# Patient Record
Sex: FEMALE | Race: AMERICAN INDIAN OR ALASKA NATIVE | NOT HISPANIC OR LATINO | Employment: FULL TIME | URBAN - METROPOLITAN AREA
[De-identification: names, ages, dates, MRNs, and addresses within clinical notes are randomized per-mention and may not be internally consistent; named-entity substitution may affect disease eponyms.]

---

## 2023-09-26 ENCOUNTER — APPOINTMENT (OUTPATIENT)
Dept: PRIMARY CARE | Facility: CLINIC | Age: 23
End: 2023-09-26
Payer: COMMERCIAL

## 2023-09-28 PROBLEM — K59.09 CHRONIC CONSTIPATION: Status: ACTIVE | Noted: 2023-09-28

## 2023-09-28 PROBLEM — I47.11 INAPPROPRIATE SINUS TACHYCARDIA (CMS-HCC): Status: ACTIVE | Noted: 2023-09-28

## 2023-09-28 PROBLEM — G90.A POTS (POSTURAL ORTHOSTATIC TACHYCARDIA SYNDROME): Status: ACTIVE | Noted: 2023-09-28

## 2023-09-28 PROBLEM — K21.9 GERD (GASTROESOPHAGEAL REFLUX DISEASE): Status: ACTIVE | Noted: 2023-09-28

## 2023-09-28 PROBLEM — R55 NEAR SYNCOPE: Status: ACTIVE | Noted: 2023-09-28

## 2023-09-28 PROBLEM — R42 LIGHTHEADEDNESS: Status: ACTIVE | Noted: 2023-09-28

## 2023-09-28 PROBLEM — J45.909 ASTHMA (HHS-HCC): Status: ACTIVE | Noted: 2023-09-28

## 2023-09-28 PROBLEM — E55.9 VITAMIN D DEFICIENCY: Status: ACTIVE | Noted: 2023-09-28

## 2023-09-28 PROBLEM — R00.2 PALPITATIONS: Status: ACTIVE | Noted: 2023-09-28

## 2023-09-28 PROBLEM — K60.2 ANAL FISSURE: Status: ACTIVE | Noted: 2023-09-28

## 2023-09-28 PROBLEM — R76.8 ANA POSITIVE: Status: ACTIVE | Noted: 2023-09-28

## 2023-09-28 PROBLEM — I95.1 ORTHOSTATIC HYPOTENSION: Status: ACTIVE | Noted: 2023-09-28

## 2023-09-28 PROBLEM — R10.9 ABDOMINAL PAIN OF MULTIPLE SITES: Status: ACTIVE | Noted: 2023-09-28

## 2023-09-28 RX ORDER — DIPHENHYDRAMINE HCL 25 MG
25 CAPSULE ORAL NIGHTLY
COMMUNITY
End: 2023-10-05 | Stop reason: ALTCHOICE

## 2023-09-28 RX ORDER — ALBUTEROL SULFATE 90 UG/1
AEROSOL, METERED RESPIRATORY (INHALATION)
COMMUNITY
Start: 2020-01-13 | End: 2024-02-21 | Stop reason: WASHOUT

## 2023-09-28 RX ORDER — METOCLOPRAMIDE 10 MG/1
10 TABLET ORAL 3 TIMES DAILY
COMMUNITY
End: 2023-10-05 | Stop reason: ALTCHOICE

## 2023-09-28 RX ORDER — PYRIDOXINE HCL (VITAMIN B6) 25 MG
25 TABLET ORAL 3 TIMES DAILY
COMMUNITY
Start: 2022-11-28 | End: 2023-10-05 | Stop reason: ALTCHOICE

## 2023-10-05 ENCOUNTER — APPOINTMENT (OUTPATIENT)
Dept: LAB | Facility: LAB | Age: 23
End: 2023-10-05
Payer: COMMERCIAL

## 2023-10-05 ENCOUNTER — OFFICE VISIT (OUTPATIENT)
Dept: RHEUMATOLOGY | Facility: CLINIC | Age: 23
End: 2023-10-05
Payer: COMMERCIAL

## 2023-10-05 VITALS
HEIGHT: 68 IN | TEMPERATURE: 97.4 F | RESPIRATION RATE: 18 BRPM | WEIGHT: 160 LBS | SYSTOLIC BLOOD PRESSURE: 123 MMHG | HEART RATE: 80 BPM | BODY MASS INDEX: 24.25 KG/M2 | DIASTOLIC BLOOD PRESSURE: 75 MMHG

## 2023-10-05 DIAGNOSIS — M35.7 HYPERMOBILITY SYNDROME: ICD-10-CM

## 2023-10-05 DIAGNOSIS — G90.A POSTURAL ORTHOSTATIC TACHYCARDIA SYNDROME: Primary | ICD-10-CM

## 2023-10-05 PROCEDURE — 99204 OFFICE O/P NEW MOD 45 MIN: CPT | Performed by: INTERNAL MEDICINE

## 2023-10-05 PROCEDURE — 99214 OFFICE O/P EST MOD 30 MIN: CPT | Mod: GC | Performed by: INTERNAL MEDICINE

## 2023-10-05 PROCEDURE — 80053 COMPREHEN METABOLIC PANEL: CPT

## 2023-10-05 PROCEDURE — 85025 COMPLETE CBC W/AUTO DIFF WBC: CPT

## 2023-10-05 PROCEDURE — 86140 C-REACTIVE PROTEIN: CPT

## 2023-10-05 PROCEDURE — 86225 DNA ANTIBODY NATIVE: CPT

## 2023-10-05 PROCEDURE — 36415 COLL VENOUS BLD VENIPUNCTURE: CPT

## 2023-10-05 PROCEDURE — 86038 ANTINUCLEAR ANTIBODIES: CPT

## 2023-10-05 PROCEDURE — 86235 NUCLEAR ANTIGEN ANTIBODY: CPT

## 2023-10-05 PROCEDURE — 85652 RBC SED RATE AUTOMATED: CPT

## 2023-10-05 RX ORDER — SODIUM CHLORIDE 1000 MG
2 TABLET, SOLUBLE MISCELLANEOUS DAILY
COMMUNITY
End: 2024-02-17 | Stop reason: WASHOUT

## 2023-10-05 RX ORDER — FAMOTIDINE 20 MG/1
20 TABLET, FILM COATED ORAL DAILY PRN
COMMUNITY
End: 2023-10-19 | Stop reason: WASHOUT

## 2023-10-05 ASSESSMENT — ENCOUNTER SYMPTOMS
PSYCHIATRIC NEGATIVE: 1
ARTHRALGIAS: 1
CONSTIPATION: 1
ALLERGIC/IMMUNOLOGIC NEGATIVE: 1
ENDOCRINE NEGATIVE: 1
HEMATOLOGIC/LYMPHATIC NEGATIVE: 1
EYES NEGATIVE: 1
RESPIRATORY NEGATIVE: 1
CONSTITUTIONAL NEGATIVE: 1

## 2023-10-05 ASSESSMENT — PAIN SCALES - GENERAL: PAINLEVEL: 0-NO PAIN

## 2023-10-05 NOTE — PATIENT INSTRUCTIONS
- We are ordering some blood tests for you to rule out the possibility of any autoimmune disease causing POTS and neuropathy.    - Please follow up with neurology for the POTS and numbness and tingling you are experiencing.  - Please wear the wrist splints at night for the carpal tunnel syndrome  - we are also referring you to PT for the knee pain associated with EDS    - RTC x PRN

## 2023-10-05 NOTE — PROGRESS NOTES
Subjective   Patient ID: [id number]   Shasha De León is a 23 y.o. female who presents for Postural Orthostatic Tachycardia Syndrome (Pots) (New patient visit).  HPI  Diagnosed with POTS - Lightheadedness, dizziness and syncope - 2 times, last time 2 weekends ago  Tilt table test proved POTS - Has not been on treatment yet.  Hx of COVID-19 when symptoms of POTS started.  Suspicion of EDS that's why referred to Rheumatology.     C/o lower back pain from disc disease.  Had pelvic floor dysfunction after 2021 then she had PT which helped in 2022.  Occasional pains when walking short distances and if squats down too fast then her knees hurt,  Has CTS bilateral since 1st pregnancy.  Was wearing wrist splints. That did not help  The numbness has progressed above the wrists , at times feel weak around the arms as well.  Occasional numbness and tingling when standing for longer duration in the lower limbs as well.     Fevers, night sweats, Weight changes,  No lumps or bumps  NO rash , Photosensitive, No oral ulcers or genital ulcers, no alopecia  Denies xerodermia, xerophthalmia, xerostomia.  No joint swellings    No family history of Anuradha Danlos disease but mentions easy bruisability in siblings who's currently undergoing work up.  No autoimmune disease in the family like Lupus, Sjogren's    Review of Systems   Constitutional: Negative.    HENT: Negative.     Eyes: Negative.    Respiratory: Negative.     Gastrointestinal:  Positive for constipation.   Endocrine: Negative.    Genitourinary: Negative.    Musculoskeletal:  Positive for arthralgias.   Allergic/Immunologic: Negative.    Hematological: Negative.    Psychiatric/Behavioral: Negative.         Objective   Physical Exam    Physical Exam  General: Cooperative, in NAD   Eyes: Conjunctiva clear, sclera white, anicteric   Nose: No external deformity, no mucosal crusting or signs of bleeding   Throat/Mouth: Moist mucous membranes, normal dentition, no ulcers or lesions  "  Lungs: No respiratory distress; lungs CTAB; no wheezes, rales, rhonchi, or stridor   Heart: Normal S1 and S2; regular rate and rhythm; no murmurs, rubs, or gallops  Skin: No rashes, ulcers, tophi, or nodules noted  MSK:   Spine: Normal posture, no point tenderness to palpation, normal ROM  Upper Extremities:   Hands: No erythema, warmth, synovitis, or pain of the DIPs, PIPs, or MCPs; normal ROM    Tinnel +ve bilaterally    Wrists: No erythema, warmth, synovitis, or pain of the wrists; normal ROM  Elbows: No erythema, warmth, synovitis, or pain; normal ROM   Shoulders: No erythema, warmth, appreciable swelling, or pain; normal ROM   Lower Extremities:   Hips: No gross deformity, erythema, warmth, or pain; normal ROM   Knees: No erythema, warmth, swelling, or pain; normal ROM   Ankles: No erythema, warmth, synovitis, or pain; normal ROM  Feet: No gross deformity, erythema, or swelling; MTP squeeze negative bilaterally     Hyperextensible thumb + elbow+ knee + able to touch the floor  with flat palms without bending knees  Mandan score >6    Patellar hypermobility apparent on exam as well    Lab Results   Component Value Date    WBC 5.0 07/12/2022    HGB 14.2 07/12/2022    HCT 42.8 07/12/2022    MCV 86 07/12/2022     07/12/2022    No results found for: \"CRP\"   Lab Results   Component Value Date    GLUCOSE 81 07/12/2022    CALCIUM 9.5 07/12/2022     07/12/2022    K 4.2 07/12/2022    CO2 28 07/12/2022     07/12/2022    BUN 12 07/12/2022    CREATININE 0.80 07/12/2022      Assessment/Plan   Diagnoses and all orders for this visit:  Postural orthostatic tachycardia syndrome  Rule out autoimmune phenomenon although low suspicion clinically  But will proceed with labs for Sjogrens at least  -     CBC and Auto Differential; Future  -     Comprehensive Metabolic Panel; Future  -     Sedimentation Rate; Future  -     C-Reactive Protein; Future  -     PRICE + ARNOLD Panel; Future  -       Hypermobility " syndrome  Brightons criteria > 6 + Arthralgias > 3 months and POTS  But not fulfilling the hEDS diagnosis at this juncture  No lab testing indicated  Will follow with neurology for POTS  Referral to Physical Therapy; Future     ?CTS - bilateral tinnels positive  But patient reports numbness starts from mid arm region bilaterally  Unsure of significance at this time, Small fibre neuropathy can co exist with POTS  Exam not entirely consistent with peripheral type of neuropathy, has patchy involvement as per my exam today  Sjogren serology ordered  EMG decision as per neurology colleagues    Case seen and discussed with Dr Chris De Oliveira  Rheumatology Fellow    Dwight De Oliveira MD

## 2023-10-06 LAB
ALBUMIN SERPL BCP-MCNC: 4.7 G/DL (ref 3.4–5)
ALP SERPL-CCNC: 76 U/L (ref 33–110)
ALT SERPL W P-5'-P-CCNC: 12 U/L (ref 7–45)
ANA SER QL HEP2 SUBST: NEGATIVE
ANION GAP SERPL CALC-SCNC: 12 MMOL/L (ref 10–20)
AST SERPL W P-5'-P-CCNC: 16 U/L (ref 9–39)
BASOPHILS # BLD AUTO: 0.05 X10*3/UL (ref 0–0.1)
BASOPHILS NFR BLD AUTO: 0.8 %
BILIRUB SERPL-MCNC: 0.4 MG/DL (ref 0–1.2)
BUN SERPL-MCNC: 11 MG/DL (ref 6–23)
CALCIUM SERPL-MCNC: 9.6 MG/DL (ref 8.6–10.6)
CENTROMERE B AB SER-ACNC: <0.2 AI
CHLORIDE SERPL-SCNC: 105 MMOL/L (ref 98–107)
CHROMATIN AB SERPL-ACNC: <0.2 AI
CO2 SERPL-SCNC: 27 MMOL/L (ref 21–32)
CREAT SERPL-MCNC: 0.76 MG/DL (ref 0.5–1.05)
CRP SERPL-MCNC: <0.1 MG/DL
DSDNA AB SER-ACNC: 1 IU/ML
ENA JO1 AB SER QL IA: <0.2 AI
ENA RNP AB SER IA-ACNC: <0.2 AI
ENA SCL70 AB SER QL IA: <0.2 AI
ENA SM AB SER IA-ACNC: <0.2 AI
ENA SM+RNP AB SER QL IA: <0.2 AI
ENA SS-A AB SER IA-ACNC: <0.2 AI
ENA SS-B AB SER IA-ACNC: <0.2 AI
EOSINOPHIL # BLD AUTO: 0.14 X10*3/UL (ref 0–0.7)
EOSINOPHIL NFR BLD AUTO: 2.3 %
ERYTHROCYTE [DISTWIDTH] IN BLOOD BY AUTOMATED COUNT: 12.2 % (ref 11.5–14.5)
ERYTHROCYTE [SEDIMENTATION RATE] IN BLOOD BY WESTERGREN METHOD: 2 MM/H (ref 0–20)
GFR SERPL CREATININE-BSD FRML MDRD: >90 ML/MIN/1.73M*2
GLUCOSE SERPL-MCNC: 82 MG/DL (ref 74–99)
HCT VFR BLD AUTO: 42.7 % (ref 36–46)
HGB BLD-MCNC: 13.7 G/DL (ref 12–16)
IMM GRANULOCYTES # BLD AUTO: 0.02 X10*3/UL (ref 0–0.7)
IMM GRANULOCYTES NFR BLD AUTO: 0.3 % (ref 0–0.9)
LYMPHOCYTES # BLD AUTO: 2.15 X10*3/UL (ref 1.2–4.8)
LYMPHOCYTES NFR BLD AUTO: 35.5 %
MCH RBC QN AUTO: 27.5 PG (ref 26–34)
MCHC RBC AUTO-ENTMCNC: 32.1 G/DL (ref 32–36)
MCV RBC AUTO: 86 FL (ref 80–100)
MONOCYTES # BLD AUTO: 0.44 X10*3/UL (ref 0.1–1)
MONOCYTES NFR BLD AUTO: 7.3 %
NEUTROPHILS # BLD AUTO: 3.26 X10*3/UL (ref 1.2–7.7)
NEUTROPHILS NFR BLD AUTO: 53.8 %
NRBC BLD-RTO: 0 /100 WBCS (ref 0–0)
PLATELET # BLD AUTO: 261 X10*3/UL (ref 150–450)
PMV BLD AUTO: 9.7 FL (ref 7.5–11.5)
POTASSIUM SERPL-SCNC: 4.3 MMOL/L (ref 3.5–5.3)
PROT SERPL-MCNC: 7.6 G/DL (ref 6.4–8.2)
RBC # BLD AUTO: 4.98 X10*6/UL (ref 4–5.2)
RIBOSOMAL P AB SER-ACNC: <0.2 AI
SODIUM SERPL-SCNC: 140 MMOL/L (ref 136–145)
WBC # BLD AUTO: 6.1 X10*3/UL (ref 4.4–11.3)

## 2023-10-19 ENCOUNTER — OFFICE VISIT (OUTPATIENT)
Dept: PRIMARY CARE | Facility: CLINIC | Age: 23
End: 2023-10-19
Payer: COMMERCIAL

## 2023-10-19 VITALS
OXYGEN SATURATION: 98 % | SYSTOLIC BLOOD PRESSURE: 113 MMHG | TEMPERATURE: 97.2 F | RESPIRATION RATE: 14 BRPM | WEIGHT: 156 LBS | DIASTOLIC BLOOD PRESSURE: 78 MMHG | BODY MASS INDEX: 23.72 KG/M2 | HEART RATE: 74 BPM

## 2023-10-19 DIAGNOSIS — G56.03 BILATERAL CARPAL TUNNEL SYNDROME: ICD-10-CM

## 2023-10-19 DIAGNOSIS — M25.561 CHRONIC PAIN OF BOTH KNEES: ICD-10-CM

## 2023-10-19 DIAGNOSIS — K58.1 IRRITABLE BOWEL SYNDROME WITH CONSTIPATION: ICD-10-CM

## 2023-10-19 DIAGNOSIS — G90.A POTS (POSTURAL ORTHOSTATIC TACHYCARDIA SYNDROME): Primary | ICD-10-CM

## 2023-10-19 DIAGNOSIS — G89.29 CHRONIC PAIN OF BOTH KNEES: ICD-10-CM

## 2023-10-19 DIAGNOSIS — I34.0 NONRHEUMATIC MITRAL VALVE REGURGITATION: ICD-10-CM

## 2023-10-19 DIAGNOSIS — M25.562 CHRONIC PAIN OF BOTH KNEES: ICD-10-CM

## 2023-10-19 PROBLEM — R10.9 ABDOMINAL PAIN OF MULTIPLE SITES: Status: RESOLVED | Noted: 2023-09-28 | Resolved: 2023-10-19

## 2023-10-19 PROBLEM — R76.8 ANA POSITIVE: Status: RESOLVED | Noted: 2023-09-28 | Resolved: 2023-10-19

## 2023-10-19 PROCEDURE — 99214 OFFICE O/P EST MOD 30 MIN: CPT | Performed by: FAMILY MEDICINE

## 2023-10-19 PROCEDURE — 1036F TOBACCO NON-USER: CPT | Performed by: FAMILY MEDICINE

## 2023-10-19 ASSESSMENT — ENCOUNTER SYMPTOMS
NAUSEA: 0
LIGHT-HEADEDNESS: 1
PSYCHIATRIC NEGATIVE: 1
VOMITING: 0
BLOOD IN STOOL: 0
ANAL BLEEDING: 0
ABDOMINAL PAIN: 0
DIARRHEA: 0
ABDOMINAL DISTENTION: 0
CONSTIPATION: 1
RECTAL PAIN: 0
CONSTITUTIONAL NEGATIVE: 1
RESPIRATORY NEGATIVE: 1
CARDIOVASCULAR NEGATIVE: 1

## 2023-10-19 NOTE — ASSESSMENT & PLAN NOTE
Neurology's assessment of tilt table test results indicated pots syndrome diagnosis.  Keep appointment with neurology at the end of November and refer to cardiology for additional input.

## 2023-10-19 NOTE — PROGRESS NOTES
Subjective   Patient ID: Shasha De León is a 23 y.o. female who presents for review testing results.    HPI patient today for follow-up since she has been in last she underwent vaginal delivery in May 2023.  She currently is breast-feeding.  She saw neurology with regards to evaluation of her lightheadedness and near syncope additional testing was performed and they have diagnosed her with POTS.  He has a follow-up with neurology at the end of November.  They also mention to her to get evaluated for possible Erler's Danlos syndrome.  She saw a rheumatology they do not feel she has this condition.  They ordered a few other additional rheumatologic tests were sure centra unremarkable.  They suggested that she go to physical therapy for her bilateral knee pain.  She complains of pain in both hands feels like electrical shocks pins-and-needles sensation worse when she sleeps.  Started during her pregnancy.  She has rheumatology told her they think she has carpal tunnel.  She is can discuss it further with the neurologist in a few weeks and see about additional testing.  Finally she continues have issues with intermittent constipation abdominal cramping and bloating some nausea no vomiting.  When she is tried taking Trulance she then swings the other way and gets diarrhea.    Review of Systems   Constitutional: Negative.    Respiratory: Negative.     Cardiovascular: Negative.    Gastrointestinal:  Positive for constipation. Negative for abdominal distention, abdominal pain, anal bleeding, blood in stool, diarrhea, nausea, rectal pain and vomiting.   Musculoskeletal:         Bilateral knee pain  Pins and needle discomfort in both hands   Neurological:  Positive for light-headedness.   Psychiatric/Behavioral: Negative.         Objective   /78   Pulse 74   Temp 36.2 °C (97.2 °F)   Resp 14   Wt 70.8 kg (156 lb)   SpO2 98%   BMI 23.72 kg/m²     Physical Exam  Vitals and nursing note reviewed.   Constitutional:        General: She is not in acute distress.     Appearance: Normal appearance. She is not ill-appearing.   HENT:      Head: Normocephalic and atraumatic.      Right Ear: Tympanic membrane, ear canal and external ear normal.      Left Ear: Tympanic membrane, ear canal and external ear normal.      Mouth/Throat:      Pharynx: Oropharynx is clear.   Eyes:      Extraocular Movements: Extraocular movements intact.   Cardiovascular:      Rate and Rhythm: Normal rate and regular rhythm.      Pulses: Normal pulses.      Heart sounds: Normal heart sounds.   Pulmonary:      Effort: Pulmonary effort is normal.      Breath sounds: Normal breath sounds.   Abdominal:      General: Abdomen is flat. Bowel sounds are normal. There is no distension.      Palpations: Abdomen is soft.      Tenderness: There is no abdominal tenderness. There is no guarding or rebound.   Musculoskeletal:         General: Normal range of motion.      Cervical back: Neck supple.      Comments: Positive Tinel's sign and Phalen sign at both wrist.  Knees flexion extension grossly intact minimal discomfort.  Slightly increased mobility laterally in both patella.   Skin:     General: Skin is warm.   Neurological:      Mental Status: She is alert and oriented to person, place, and time. Mental status is at baseline.   Psychiatric:         Mood and Affect: Mood normal.     Recent special reports reviewed with patient    Follow through with neurology with regards to POTS diagnosis  They have told her to continue the sodium chloride tablets  Also she is going to discuss carpal tunnel syndrome and see if they can get an EMG completed    Regarding her GI symptoms we discussed that they may be related to IBS with a constipation component.  Unfortunately most of the meds are not indicated when breast-feeding we will try dietary modifications reevaluate in 8 weeks if symptoms or not improving will consider GI referral    Bilateral knee x-rays and start physical  therapy    Cardiology referral for further input with regards to POTS as well as her past history of mitral regurg    Recent labs reviewed with patient    Refuses flu vaccine    Return to office in 8 weeks to reassess her case    Assessment/Plan   Problem List Items Addressed This Visit             ICD-10-CM    POTS (postural orthostatic tachycardia syndrome) - Primary G90.A     Neurology's assessment of tilt table test results indicated pots syndrome diagnosis.  Keep appointment with neurology at the end of November and refer to cardiology for additional input.         Relevant Orders    Follow Up In Primary Care - Established    Referral to Cardiology    Chronic pain of both knees M25.561, M25.562, G89.29     Referral to physical therapy as per rheumatology's recommendation  Bilateral knee x-rays         Relevant Orders    Follow Up In Primary Care - Established    XR knee right 3 views    XR knee left 3 views    Bilateral carpal tunnel syndrome G56.03     Clinically suspect bilateral carpal tunnel.  She will see neurology in a few weeks regarding getting an EMG done.  If they do not follow-up on this then we will discuss EMG testing at her appointment in 8 weeks.         Relevant Orders    Follow Up In Primary Care - Established    Irritable bowel syndrome with constipation K58.1     Patient appears to have a constipation component associated with her IBS.  Trulance which she is taken previously unfortunately causes her to get diarrhea.  She currently is breast-feeding and most of the more common IBS meds are not recommended we will monitor symptoms for now discussed dietary modifications.  And reevaluate in 8 weeks.         Relevant Orders    Follow Up In Primary Care - Established    Nonrheumatic mitral valve regurgitation I34.0     Echo from May 2021 reviewed.  Refer to cardiology for follow-up         Relevant Orders    Referral to Cardiology

## 2023-10-19 NOTE — ASSESSMENT & PLAN NOTE
Clinically suspect bilateral carpal tunnel.  She will see neurology in a few weeks regarding getting an EMG done.  If they do not follow-up on this then we will discuss EMG testing at her appointment in 8 weeks.

## 2023-10-19 NOTE — ASSESSMENT & PLAN NOTE
Patient appears to have a constipation component associated with her IBS.  Trulance which she is taken previously unfortunately causes her to get diarrhea.  She currently is breast-feeding and most of the more common IBS meds are not recommended we will monitor symptoms for now discussed dietary modifications.  And reevaluate in 8 weeks.

## 2023-10-25 ENCOUNTER — ANCILLARY PROCEDURE (OUTPATIENT)
Dept: RADIOLOGY | Facility: CLINIC | Age: 23
End: 2023-10-25
Payer: COMMERCIAL

## 2023-10-25 DIAGNOSIS — M25.561 CHRONIC PAIN OF BOTH KNEES: ICD-10-CM

## 2023-10-25 DIAGNOSIS — G89.29 CHRONIC PAIN OF BOTH KNEES: ICD-10-CM

## 2023-10-25 DIAGNOSIS — M25.562 CHRONIC PAIN OF BOTH KNEES: ICD-10-CM

## 2023-10-25 PROCEDURE — 73562 X-RAY EXAM OF KNEE 3: CPT | Mod: LEFT SIDE | Performed by: RADIOLOGY

## 2023-10-25 PROCEDURE — 73562 X-RAY EXAM OF KNEE 3: CPT | Mod: LT

## 2023-10-25 PROCEDURE — 73562 X-RAY EXAM OF KNEE 3: CPT | Mod: RT

## 2023-11-06 ENCOUNTER — APPOINTMENT (OUTPATIENT)
Dept: NEUROLOGY | Facility: CLINIC | Age: 23
End: 2023-11-06
Payer: COMMERCIAL

## 2023-11-20 ENCOUNTER — APPOINTMENT (OUTPATIENT)
Dept: NEUROLOGY | Facility: CLINIC | Age: 23
End: 2023-11-20
Payer: COMMERCIAL

## 2023-12-18 ENCOUNTER — TELEPHONE (OUTPATIENT)
Dept: PRIMARY CARE | Facility: CLINIC | Age: 23
End: 2023-12-18
Payer: COMMERCIAL

## 2023-12-18 NOTE — TELEPHONE ENCOUNTER
Patient called in and is wondering if you wanted her to see a specific cardiologist, or just to find one that is closest to her. Please advise.

## 2023-12-21 ENCOUNTER — APPOINTMENT (OUTPATIENT)
Dept: PRIMARY CARE | Facility: CLINIC | Age: 23
End: 2023-12-21
Payer: COMMERCIAL

## 2024-01-03 ENCOUNTER — OFFICE VISIT (OUTPATIENT)
Dept: NEUROLOGY | Facility: HOSPITAL | Age: 24
End: 2024-01-03
Payer: COMMERCIAL

## 2024-01-03 VITALS
HEART RATE: 116 BPM | RESPIRATION RATE: 18 BRPM | BODY MASS INDEX: 22.58 KG/M2 | DIASTOLIC BLOOD PRESSURE: 86 MMHG | WEIGHT: 149 LBS | TEMPERATURE: 96.6 F | SYSTOLIC BLOOD PRESSURE: 113 MMHG | HEIGHT: 68 IN

## 2024-01-03 DIAGNOSIS — G56.03 CARPAL TUNNEL SYNDROME ON BOTH SIDES: ICD-10-CM

## 2024-01-03 DIAGNOSIS — G90.A POTS (POSTURAL ORTHOSTATIC TACHYCARDIA SYNDROME): Primary | ICD-10-CM

## 2024-01-03 PROCEDURE — 1036F TOBACCO NON-USER: CPT | Performed by: PSYCHIATRY & NEUROLOGY

## 2024-01-03 PROCEDURE — 99205 OFFICE O/P NEW HI 60 MIN: CPT | Performed by: PSYCHIATRY & NEUROLOGY

## 2024-01-03 PROCEDURE — 99215 OFFICE O/P EST HI 40 MIN: CPT | Mod: GC | Performed by: PSYCHIATRY & NEUROLOGY

## 2024-01-03 ASSESSMENT — PAIN SCALES - GENERAL: PAINLEVEL: 0-NO PAIN

## 2024-01-03 NOTE — PROGRESS NOTES
Date of Service: 1/3/2024  Patient: Shasha De León  MRN: [id number]  Primary Care Physician: Bandar Palomo MD     History of Present Illness:    Ms. De León is a 23 y.o. right handed  female presenting for evaluation of POTS and bilateral hand numbness.      Since her teenage years she has been having lightheadedness when she stands up, vision blurs, feels like she is going to faint.  This lasts for short time and is associated with palpitations and tingling in the legs and nausea.  The symptoms also occur with position changes, for example turning in the bed and resolves when she bends forward or sits down.  Worsens during the summer seasons and improves during bryant.  She has had 2 syncopal events, first occurred in the summer 2022 and second in summer 2023.  The symptoms worsened significantly with her first pregnancy in 2021, after which she had autonomic testing done on 9/1/2023 which showed an exaggerated orthostatic tachycardia, or rise in heart rate by more than 50 bpm on tilt table testing.  She also followed with cardiology since 2021, echocardiogram from 5/2021 showed normal LV function and ejection fraction.  Cardiac monitoring showed sinus rhythm with rare PACs.  She drinks about 1 gallon of fluid every day.  She tried salt tabs for a month without improvement and stopped taking it.  She wore compression stockings during the pregnancy up to the knee, on and off for few months, and is currently not wearing those.At this point, her symptoms have improved during the winter season.    She also reports decreased sweating throughout her life and recent abdominal cramps and constipation and was diagnosed with irritable bowel syndrome.    She also complains of numbness and tingling in both hands, which started in 2021 when she was pregnant and was clinically diagnosed with bilateral carpal tunnel syndrome.  It improved slightly after her first delivery but worsened significantly with the second  "pregnancy in 2023 and has not resolved since, rather worsened.  She reports tingling in both hands involving all fingers, worse at night and when holding her baby in the arms with arm flexed.  She also endorses mild weakness in both hands, right being slightly worse than the left, however, tingling is limited in both hands.  The symptoms improved when she straightens her arm or shakes her hands.    She has 2 kids-2-year-old and 7-month-old.  She is currently breast-feeding.      No Known Allergies     Medications:    Current Outpatient Medications:     albuterol 90 mcg/actuation inhaler, 2 puffs every 4-6 hours as needed shortness of breath or cough, Disp: , Rfl:     PNV no.153/FA/om3/dha/epa/fish (PRENATAL GUMMIES ORAL), Prenatal Gummies 0.18-25 MG Oral Tablet Chewable  Refills: 0      Start : 7-Jul-2022  Active, Disp: , Rfl:     sodium chloride 1,000 mg tablet, Take 2 tablets (2 g) by mouth once daily., Disp: , Rfl:      Problem List Items Addressed This Visit       POTS (postural orthostatic tachycardia syndrome)    Relevant Orders    Compression Stockings 30-40 mmHg     Other Visit Diagnoses       Carpal tunnel syndrome on both sides    -  Primary    Relevant Orders    EMG & nerve conduction            Physical Exam:     /86   Pulse (!) 116   Temp 35.9 °C (96.6 °F)   Resp 18   Ht 1.727 m (5' 8\")   Wt 67.6 kg (149 lb)   BMI 22.66 kg/m²     Brief Neurological Exam:  Mental status: alert and oriented to person, place, and date. Speech is intact to conversation. Fund of knowledge is normal.     Cranial nerves:  CN 2   Visual fields full to confrontation.   CN 3, 4, 6   Pupils round, 4 mm in diameter, equally reactive to light. Lids symmetric; no ptosis. EOMs normal alignment, full range.   No nystagmus.   CN 5   Facial sensation intact bilaterally.   CN 7   Normal and symmetric facial strength. Nasolabial folds symmetric.   CN 8   Hearing intact to conversation.   CN 9   Palate elevates symmetrically. "   CN 11   Normal strength of shoulder shrug and neck turning.   CN 12   Tongue midline, with normal bulk and strength; no fasciculations.      Motor:  Muscle bulk and tone are normal. There are no fasciculations, tremor or other abnormal movement.    MANUAL MUSCLE TESTING IS AS FOLLOWS:    R L      5 5 Shoulder abduction   5 5 Elbow flexion   5 5 Elbow extension   5 5 Wrist flexion   5 5 Wrist extension   5 5 Finger flexion   5 5 Finger extension   5 5 Finger abduction   5 5 Thumb distal flexion   5 5 Thumb abduction   Phalen's test: Tingling in all the fingers in both hands.    5 5 Hip flexion   5 5 Hip adduction   5 5 Hip abduction   5 5 Knee flexion   5 5 Knee extension   5 5 Ankle dorsiflexion   5 5 Ankle plantarflexion   5 5 Eversion   5 5 Inversion   5 5 Big toe extension     Reflexes:   R L    2 2 Biceps    2 2 Brachioradialis    2 2 Triceps    2 2 Patellar   2 2 Achilles      Sensory:   Pinprick sensation and touch sensation are normal and symmetrical in bilateral hands.       Coordination:  Finger-nose-finger is normal without dysmetria or overshoot.     Gait:  Station is stable with a normal base. Gait is stable with a normal arm swing and speed.       Results:     The following labs, imaging, and results were personally reviewed and demonstrated:    Labs:    Lab Results   Component Value Date    PRICE Negative 10/05/2023     CBC:   Lab Results   Component Value Date    WBC 6.1 10/05/2023    HGB 13.7 10/05/2023    HCT 42.7 10/05/2023     10/05/2023     Autonomic testin2023  This study is abnormal due to exaggerated orthostatic tachycardia, rising heart rate more than 50 bpm on tilt table testing.  This is consistent with postural tachycardia syndrome.    Impression/Plan:     Impression:  Shasha De León is a 23 y.o. woman with postural orthostatic tachycardia syndrome (POTS) and bilateral hand numbness and tingling.    She has longstanding history of postural palpitations, lightheadedness,  nausea and tingling in the legs and 2 lifetime episodes of syncope which worsens during the summer and improves in bryant.  She is diagnosed with POTS based on tilt table testing in September 2023.  Her heart rate increased on standing up from sitting by 35 bpm in the clinic today.  She has not tried all the conservative measures for POTS yet.  We discussed all the conservative measures again today.  She is currently breast-feeding and is not interested in taking oral medications.  Her symptoms normally worsen around summertime, we will revisit starting oral medication at that point if the conservative measures do not help.    Regarding her bilateral hand numbness and tingling, she does not have a fixed sensory loss on exam.  This is going on since 2021 during her first pregnancy but has not completely resolved since.  Phalen's maneuver resulted in numbness in all the fingers bilaterally.  Her symptoms are worse at night, while sleeping and while flexing her elbows.  Suspect this to be bilateral carpal tunnel syndrome.  Will we will obtain EMGs of bilateral upper extremities for median and ulnar neuropathy.    Plan:  -Nonpharmacological approach for POTS:  1) Oral fluid intake (mainly water) of at least 3 liters.  2) Liberalize salt intake. Aim for a total of 1 tsp of salt in the morning and 1 tsp in the early evening. Example of salty foods include: soups/broth, pretzels & crackers, salted nuts, and electrolyte drinks.   3) Exercise. Focus on lower extremity resistance strength training.  4) Elevate the head end of the bed to up to 10 to 15 degrees while sleeping.    5) Compressive garments can help with the symptoms: graded compressive stockings (thigh-high stockings at 30 to 40 mmHg)     -EMG of bilateral upper extremities for median and ulnar neuropathies.  Scheduled for 2/15/2024.  She will continue to wear wrist braces in the interim.  -Follow-up after the EMG.      Orders Placed This Encounter   Procedures     Compression Stockings 30-40 mmHg    EMG & nerve conduction     Standing Status:   Future     Standing Expiration Date:   1/3/2025     Scheduling Instructions:      At the time of the EMG appointment, the patient's skin should be clear of lotions, oils, or creams.  No other special preparations are required.  Patients can take all other medications as prescribed.  Please contact electromyography laboratory if patient is on cholinesterase inhibitor treatment.  T      here are no after effects and the patient can return to their usual activities immediately upon leaving the laboratory.  The results of the EMG examination are posted to the EHR, faxed and mailed to referring physician. If you have further questions, please call the Neuro EMG laboratory at [phone] for the  Core Labs:  [place] ([facility]), [facility], [facility], and [place].       For [place] call [phone].  Ask for Cardiology      For [place] call [phone].       For [place] call [phone]      For [place] call [phone]. Select option 3      For [place] call [phone].       For [place] call [phone].       For [place] call [phone].           Order Specific Question:   EMG Indication     Answer:   Carpal Tunnel Syndrome (CTS)     Order Specific Question:   Performing Entity     Answer:   [place] ([facility])     Order Specific Question:   Laterality     Answer:   Bilateral     Order Specific Question:   Patient is on anticoagulant     Answer:   No     Order Specific Question:   Release result to [facility]     Answer:   Immediate [1]          Sara Melara MD  Neuromuscular Fellow  Neurology, Neuromuscular Division  [facility]    ATTENDING NOTE - CHERRIE DUNHAM M.D.    I saw patient with trainee and agree with the edits, history and exam that I helped formulate per above.    She has 2 likely independent issues:    1.  A long history since adolescence  of orthostatic intolerance with heart tachycardia and dizziness.  She had 2 syncopal episodes in the past.  The symptoms are usually worse in the summer.  Her autonomic studies which we reviewed personally reveals prominent orthostatic tachycardia consistent with postural tachycardia syndrome.  We stressed to her the importance of nonpharmacological therapy particularly seizures since she is since she is nursing.  This should include increasing fluid, and increasing salt intake, raising the head rest of bed, exercising legs and using 30 mm stockings.  2.  Intermittent numbness of both hands that started 3 years ago during her first pregnancy and did not resolve completely afterwards.  It worsened after her second pregnancy and continues since her delivery more than 7 months ago.  This occur at night frequently and when she holds the baby or uses her hands.  She has used bilateral wrist splints with no effect.  She has a positive Phalen sign but no sensory loss, hand weakness or thenar atrophy.  We will obtain an EMG study and will consider steroid injection if mild or moderate.  We discussed this with the patient.      Dru Hernandez M.D., F.A.C.P.   Director, Neuromuscular Center & EMG laboratory   [facility] [place] [facility]   [facility]   Professor of Neurology   [facility]    The total appointment time today was 60 minutes. Time included preparing to see the patient, obtaining the history, performing a medically necessary appropriate physical examination, counseling and educating the patient/family, ordering tests, referring and communicating with other providers, independently interpreting results  to the patient/family and documenting clinical information in the medical record.

## 2024-01-08 ENCOUNTER — APPOINTMENT (OUTPATIENT)
Dept: CARDIOLOGY | Facility: CLINIC | Age: 24
End: 2024-01-08
Payer: COMMERCIAL

## 2024-01-15 RX ORDER — BISMUTH SUBSALICYLATE 262 MG
1 TABLET,CHEWABLE ORAL DAILY
COMMUNITY

## 2024-01-17 NOTE — PROGRESS NOTES
[facility] Heart and Vascular Cardiology    Patient Name: Shasha De León  Patient : 2000      Scribe Attestation  By signing my name below, I, Candy Villavicencio   attest that this documentation has been prepared under the direction and in the presence of Clifford Owen DO.      Reason for visit:  This is a 23-year-old female here for follow-up regarding POTS.      HPI:  This is a 23-year-old female here for follow-up regarding POTS.  The patient was previously seen by me in 2021 at which time I stated the patient could follow-up on an as-needed basis.  The patient has since been following with neurology for management of POTS.  Echocardiogram done in May 2021 showed normal left ventricular size and systolic function with an ejection fraction of 60%, normal right ventricular size and systolic function, no significant valve abnormalities.  Cardiac monitor done in  showed underlying sinus rhythm with an average heart rate of 83 bpm, rare PACs and patient symptoms were associated with sinus rhythm or sinus tachycardia.  Patient was subsequently seen by the cardiology PA in 2022 and subsequently by EP cardiology in 2022 was felt patient had autonomic dysfunction and was started on Florinef. The patient underwent a tilt table study in 2023 which was abnormal secondary to exaggerated orthostatic tachycardia with a rise in heart rate of greater than 50 bpm until table testing. CMP done in 2023 showed normal serum sodium and potassium with a serum creatinine of 0.76, normal ALT and AST. CBC showed a hemoglobin of 13.7.  ECG done today showed sinus rhythm with a heart rate of 68 bpm.  The patient reports rare episodes of palpitations which do not bother her as much. She also reports mild occasional episodes of brief chest discomfort with no associated shortness of breath on exertion or lightheadedness. During my exam, she was resting comfortably on the  exam table.        Assessment/Plan:   1. POTS  The patient has a history of POTS and is being followed by Neurology.  The patient underwent a tilt table study in September 2023 which was abnormal secondary to exaggerated orthostatic tachycardia with a rise in heart rate of greater than 50 bpm until table testing.  ECG done today showed sinus rhythm with a heart rate of 68 bpm.    She denies chest pain, shortness of breath, palpitations or syncopal episodes.  Blood pressure appears controlled on exam today.  Echocardiogram done in May 2021 showed normal left ventricular size and systolic function with an ejection fraction of 60%, normal right ventricular size and systolic function, no significant valve abnormalities.    Cardiac monitor done in 2021 showed underlying sinus rhythm with an average heart rate of 83 bpm, rare PACs and patient symptoms were associated with sinus rhythm or sinus tachycardia.   Recent lab works as noted in the HPI.  Patient should follow general recommendations including staying well-hydrated, changing the positions slowly, wearing compression stockings as necessary, and routine exercise.   Follow up in 1 year and sooner if necessary.     2. Palpitations  The patient reports rare episodes of palpitations as described in the HPI.  ECG done today showed sinus rhythm with a heart rate of 68 bpm.    Cardiac monitor done in 2021 showed underlying sinus rhythm with an average heart rate of 83 bpm, rare PACs and patient symptoms were associated with sinus rhythm or sinus tachycardia.   Recent lab works as noted in the HPI.  Holter monitor was ordered as noted below.   Patient should follow general recommendations including avoiding excessive alcohol intake, avoiding excessive caffeine intake, staying well-hydrated, getting an appropriate amount of sleep.    3. Chest discomfort  The patient reports occasional chest discomfort as described in the HPI.  ECG done today showed sinus rhythm with a heart  rate of 68 bpm.    Blood pressure appears controlled on exam today.  Treadmill exercise stress test was suggested which she declined at this time.  Continue risk factor modification.    Orders:   Holter monitor   Treadmill exercise stress test -patient declined.  Follow up in 1 year     Lifestyle Recommendations  I recommend a whole-food plant-based diet, an eating pattern that encourages the consumption of unrefined plant foods (such as fruits, vegetables, tubers, whole grains, legumes, nuts and seeds) and discourages meats, dairy products, eggs and processed foods.     The AHA/ACC recommends that the patient consume a dietary pattern that emphasizes intake of vegetables, fruits, and whole grains; includes low-fat dairy products, poultry, fish, legumes, non-tropical vegetable oils, and nuts; and limits intake of sodium, sweets, sugar-sweetened beverages, and red meats.  Adapt this dietary pattern to appropriate calorie requirements (a 500-750 kcal/day deficit to loose weight), personal and cultural food preferences, and nutrition therapy for other medical conditions (including diabetes).  Achieve this pattern by following plans such as the [place] Mediterranean, DASH dietary pattern, or AHA diet.     Engage in 2 hours and 30 minutes per week of moderate-intensity physical activity, or 1 hour and 15 minutes (75 minutes) per week of vigorous-intensity aerobic physical activity, or an equivalent combination of moderate and vigorous-intensity aerobic physical activity. Aerobic activity should be performed in episodes of at least 10 minutes preferably spread throughout the week.     Adhering to a heart healthy diet, regular exercise habits, avoidance of tobacco products, and maintenance of a healthy weight are crucial components of their heart disease risk reduction.     Any positive review of systems not specifically addressed in the office visit today should be evaluated and treated by the patients primary care  "physician or in an emergency department if necessary     Patient was notified that results from ordered tests will be called to the patient if it changes current management; it will otherwise be discussed at a future appointment and available on [facility].     Thank you for allowing me to participate in the care of this patient.        This document was generated using the assistance of voice recognition software. If there are any errors of spelling, grammar, syntax, or meaning; please feel free to contact me directly for clarification.    Past Medical History:  She has no past medical history on file.    Past Surgical History:  She has a past surgical history that includes Other surgical history (07/07/2022).      Social History:  She reports that she has never smoked. She has never been exposed to tobacco smoke. She has never used smokeless tobacco. She reports current alcohol use. She reports that she does not use drugs.    Family History:  No family history on file.     Allergies:  Patient has no known allergies.    Outpatient Medications:  Current Outpatient Medications   Medication Instructions    albuterol 90 mcg/actuation inhaler 2 puffs every 4-6 hours as needed shortness of breath or cough    multivitamin tablet 1 tablet, oral, Daily    sodium chloride 2 g, oral, Daily        ROS:  A 14 point review of systems was done and is negative other than as stated in HPI    Vitals:      10/19/2022     5:06 PM 11/2/2022    12:49 PM 10/5/2023     3:03 PM 10/19/2023     2:48 PM 1/3/2024     9:47 AM 1/3/2024    10:48 AM 1/3/2024    10:52 AM   Vitals   Systolic 118 128 123 113 126 107 113   Diastolic 75 78 75 78 80 70 86   Heart Rate 84 98 80 74 80 78 116   Temp 36.4 °C (97.5 °F)  36.3 °C (97.4 °F) 36.2 °C (97.2 °F) 35.9 °C (96.6 °F)     Resp 14 18 18 14 18     Height (in)  1.727 m (5' 8\") 1.727 m (5' 8\")  1.727 m (5' 8\")     Weight (lb) 158 158 160 156 149     BMI 24.02 kg/m2 24.02 kg/m2 24.33 kg/m2 23.72 kg/m2 22.66 " kg/m2     BSA (m2) 1.85 m2 1.85 m2 1.87 m2 1.84 m2 1.8 m2          Physical Exam:   Constitutional: Cooperative, in no acute distress, alert, appears stated age.  Skin: Skin color, texture, turgor normal. No rashes or lesions.  Head: Normocephalic. No masses, lesions, tenderness or abnormalities  Eyes: Extraocular movements are grossly intact.  Mouth and throat: Mucous membranes moist  Neck: Neck supple, no carotid bruits, no JVD  Respiratory: Lungs clear to auscultation, no wheezing or rhonchi, no use of accessory muscles  Chest wall: No scars, normal excursion with respiration  Cardiovascular: Regular rhythm without murmur  Gastrointestinal: Abdomen soft, nontender. Bowel sounds normal.  Musculoskeletal: Strength equal in upper extremities  Extremities: No pitting edema  Neurologic: Sensation grossly intact, alert and oriented ×3    Intake/Output:   No intake/output data recorded.    Outpatient Medications  Current Outpatient Medications on File Prior to Visit   Medication Sig Dispense Refill    albuterol 90 mcg/actuation inhaler 2 puffs every 4-6 hours as needed shortness of breath or cough      multivitamin tablet Take 1 tablet by mouth once daily.      sodium chloride 1,000 mg tablet Take 2 tablets (2 g) by mouth once daily.      [DISCONTINUED] PNV no.153/FA/om3/dha/epa/fish (PRENATAL GUMMIES ORAL) Prenatal Gummies 0.18-25 MG Oral Tablet Chewable   Refills: 0        Start : 7-Jul-2022   Active       No current facility-administered medications on file prior to visit.       Labs: (past 26 weeks)  Recent Results (from the past 4368 hour(s))   CBC and Auto Differential    Collection Time: 10/05/23  4:55 PM   Result Value Ref Range    WBC 6.1 4.4 - 11.3 x10*3/uL    nRBC 0.0 0.0 - 0.0 /100 WBCs    RBC 4.98 4.00 - 5.20 x10*6/uL    Hemoglobin 13.7 12.0 - 16.0 g/dL    Hematocrit 42.7 36.0 - 46.0 %    MCV 86 80 - 100 fL    MCH 27.5 26.0 - 34.0 pg    MCHC 32.1 32.0 - 36.0 g/dL    RDW 12.2 11.5 - 14.5 %    Platelets 261  150 - 450 x10*3/uL    MPV 9.7 7.5 - 11.5 fL    Neutrophils % 53.8 40.0 - 80.0 %    Immature Granulocytes %, Automated 0.3 0.0 - 0.9 %    Lymphocytes % 35.5 13.0 - 44.0 %    Monocytes % 7.3 2.0 - 10.0 %    Eosinophils % 2.3 0.0 - 6.0 %    Basophils % 0.8 0.0 - 2.0 %    Neutrophils Absolute 3.26 1.20 - 7.70 x10*3/uL    Immature Granulocytes Absolute, Automated 0.02 0.00 - 0.70 x10*3/uL    Lymphocytes Absolute 2.15 1.20 - 4.80 x10*3/uL    Monocytes Absolute 0.44 0.10 - 1.00 x10*3/uL    Eosinophils Absolute 0.14 0.00 - 0.70 x10*3/uL    Basophils Absolute 0.05 0.00 - 0.10 x10*3/uL   Comprehensive Metabolic Panel    Collection Time: 10/05/23  4:55 PM   Result Value Ref Range    Glucose 82 74 - 99 mg/dL    Sodium 140 136 - 145 mmol/L    Potassium 4.3 3.5 - 5.3 mmol/L    Chloride 105 98 - 107 mmol/L    Bicarbonate 27 21 - 32 mmol/L    Anion Gap 12 10 - 20 mmol/L    Urea Nitrogen 11 6 - 23 mg/dL    Creatinine 0.76 0.50 - 1.05 mg/dL    eGFR >90 >60 mL/min/1.73m*2    Calcium 9.6 8.6 - 10.6 mg/dL    Albumin 4.7 3.4 - 5.0 g/dL    Alkaline Phosphatase 76 33 - 110 U/L    Total Protein 7.6 6.4 - 8.2 g/dL    AST 16 9 - 39 U/L    Bilirubin, Total 0.4 0.0 - 1.2 mg/dL    ALT 12 7 - 45 U/L   Sedimentation Rate    Collection Time: 10/05/23  4:55 PM   Result Value Ref Range    Sedimentation Rate 2 0 - 20 mm/h   C-Reactive Protein    Collection Time: 10/05/23  4:55 PM   Result Value Ref Range    C-Reactive Protein <0.10 <1.00 mg/dL   PRICE without Reflex ARNOLD    Collection Time: 10/05/23  4:55 PM   Result Value Ref Range    PRICE Negative Negative   ARNOLD Panel    Collection Time: 10/05/23  4:55 PM   Result Value Ref Range    Anti-SM <0.2 <1.0 AI    Anti-RNP <0.2 <1.0 AI    Anti-SM/RNP <0.2 <1.0 AI    Anti-SSA <0.2 <1.0 AI    Anti-SSB <0.2 <1.0 AI    Anti-SCL-70 <0.2 <1.0 AI    Anti-LILIANA-1 IgG <0.2 <1.0 AI    Anti-Chromatin <0.2 <1.0 AI    Anti-Centromere <0.2 <1.0 AI    ANTI-RIBOSOMAL P <0.2 <1.0 AI    Anti-DNA (DS) 1.0 <5.0 IU/mL       ECG  No  results found for this or any previous visit (from the past 4464 hour(s)).    Echocardiogram  No results found for this or any previous visit from the past 1095 days.      CV Studies:  EKG: No results found for this or any previous visit (from the past 4464 hour(s)).  Echocardiogram:   Echocardiogram     [address] [address]  Phone [phone] Fax [phone]    TRANSTHORACIC ECHOCARDIOGRAM REPORT      Patient Name:     JULES GEORGE Reading Physician:   [id number] Cesar Boyer MD  Study Date:       5/18/2021         Referring Physician: [id number] SILVIA BOYD  MRN/PID:          [id number]          PCP:  Accession/Order#: [id number]      Department Location: [facility] Echo Lab  YOB: 2000          Fellow:  Gender:           F                 Nurse:  Admit Date:       5/18/2021         Sonographer:         Toshia Adams RDCS  Admission Status: Outpatient        Additional Staff:  Height:           172.72 cm         CC Report to:  Weight:           81.65 kg          Study Type:          Echocardiogram  BSA:              1.95 m2  Blood Pressure: 120 /68 mmHg    Diagnosis/ICD: R42-Dizziness and giddiness; R00.2-Palpitations  Indication:    PALPITATIONS, LIGHTHEADEDNESS  Procedure/CPT: Echo Complete w Full Doppler-[id number]  Study Detail: The following Echo studies were performed: 2D, M-Mode, Doppler and  color flow.      PHYSICIAN INTERPRETATION:  Left Ventricle: The left ventricular systolic function is normal, with an estimated ejection fraction of 60-65%. There are no regional wall motion abnormalities. The left ventricular cavity size is normal. Spectral Doppler shows an impaired relaxation pattern of left ventricular diastolic filling.  Left Atrium: The left atrium is normal in size. Bubble study was negative. There is no evidence of a patent foramen ovale. There is no atrial septal defect present.  Right Ventricle: The right ventricle is normal in  size. There is normal right ventricular global systolic function.  Right Atrium: The right atrium is normal in size.  Aortic Valve: The aortic valve appears structurally normal. There is no evidence of aortic valve regurgitation.  Mitral Valve: The mitral valve is mildly thickened. There is mild mitral valve regurgitation.  Tricuspid Valve: The tricuspid valve is structurally normal. There is trace tricuspid regurgitation.  Pulmonic Valve: The pulmonic valve is structurally normal. There is no indication of pulmonic valve regurgitation.  Pericardium: There is no pericardial effusion noted.  Aorta: The aortic root is normal.      CONCLUSIONS:  1. The left ventricular systolic function is normal with a 60-65% estimated ejection fraction.  2. Spectral Doppler shows an impaired relaxation pattern of left ventricular diastolic filling.  3. There is no evidence of a patent foramen ovale.    QUANTITATIVE DATA SUMMARY:  2D MEASUREMENTS:  Normal Ranges:  Ao Root d:     2.20 cm   (2.0-3.7cm)  LAs:           3.40 cm   (2.7-4.0cm)  IVSd:          0.96 cm   (0.6-1.1cm)  LVPWd:         0.95 cm   (0.6-1.1cm)  LVIDd:         4.26 cm   (3.9-5.9cm)  LVIDs:         3.01 cm  LV Mass Index: 67.4 g/m2  LV % FS        29.3 %    LA VOLUME:  Normal Ranges:  LA Vol A4C:        39.3 ml    (22+/-6mL/m2)  LA Vol A2C:        37.3 ml  LA Vol BP:         42.4 ml  LA Vol Index A4C:  20.1ml/m2  LA Vol Index A2C:  19.1 ml/m2  LA Vol Index BP:   21.7 ml/m2  LA Area A4C:       14.1 cm2  LA Area A2C:       15.2 cm2  LA Major Axis A4C: 4.3 cm  LA Major Axis A2C: 5.3 cm  LA Volume Index:   18.2 ml/m2    RA VOLUME BY A/L METHOD:  Normal Ranges:  RA Area A4C: 12.6 cm2    M-MODE MEASUREMENTS:  Normal Ranges:  Ao Root: 2.20 cm (2.0-3.7cm)    AORTA MEASUREMENTS:  Normal Ranges:  Asc Ao, d: 2.20 cm (2.1-3.4cm)    LV SYSTOLIC FUNCTION BY 2D PLANIMETRY (MOD):  Normal Ranges:  EF-A4C View: 65.3 % (>55%)  EF-A2C View: 65.4 %  EF-Biplane:  65.4 %    LV DIASTOLIC  FUNCTION:  Normal Ranges:  MV Peak E:    0.83 m/s (0.7-1.2 m/s)  MV Peak A:    0.75 m/s (0.42-0.7 m/s)  E/A Ratio:    1.11     (1.0-2.2)  MV e'         0.20 m/s (>8.0)  MV lateral e' 0.20 m/s  MV medial e'  0.16 m/s  E/e' Ratio:   4.26     (<8.0)    MITRAL VALVE:  Normal Ranges:  MV DT: 182 msec (150-240msec)    MITRAL INSUFFICIENCY:  Normal Ranges:  MR VTI:  145.00 cm  MR Vmax: 455.00 cm/s    RIGHT VENTRICLE:  RV 1   2.8 cm  RV 2   2.16 cm  RV 3   5.73 cm  TAPSE: 32.6 mm  RV s'  0.18 m/s    TRICUSPID VALVE/RVSP:  Normal Ranges:  Peak TR Velocity: 2.24 m/s  RV Syst Pressure: 23.1 mmHg (< 30mmHg)  TV E Vmax:        0.41 m/s  (0.3-0.7m/s)  TV A Vmax:        0.30 m/s  IVC Diam:         1.56 cm      [id number] Cesar Boyer MD  Electronically signed on 5/18/2021 at 6:27:34 PM         Final     Stress Testing IMGRESULT(FIW4040:1:1825): No results found for this or any previous visit from the past 1825 days.    Cardiac Catheterization: No results found for this or any previous visit from the past 1825 days.  No results found for this or any previous visit from the past 3650 days.     Cardiac Scoring: No results found for this or any previous visit from the past 1825 days.    AAA : No results found for this or any previous visit from the past 1825 days.    OTHER: No results found for this or any previous visit from the past 1825 days.    LAST IMAGING RESULTS  XR knee right 3 views, XR knee left 3 views  Interpreted By:  Panfilo Adams,   STUDY:  XR KNEE LEFT 3 VIEWS; XR KNEE RIGHT 3 VIEWS;  10/25/2023 3:00 pm      INDICATION:  Signs/Symptoms:pain.      COMPARISON:  none      ACCESSION NUMBER(S):  [id number]; [id number]      ORDERING CLINICIAN:  JEAN PIERRE CASTLE      FINDINGS:  Three views left knee and three views right knee      Bilateral joint spaces maintained. No fracture or dislocation. No  osseous lesion. No effusion bilaterally.      IMPRESSION  Normal bilateral knee radiographs.          MACRO  none      Signed by: Panfilo  Angie 10/26/2023 12:42 PM  Dictation workstation:   [id number]      Problem List Items Addressed This Visit       POTS (postural orthostatic tachycardia syndrome) - Primary            Clifford Owen DO, FACC, FACOI

## 2024-01-22 ENCOUNTER — DOCUMENTATION (OUTPATIENT)
Dept: CARDIOLOGY | Facility: CLINIC | Age: 24
End: 2024-01-22

## 2024-01-22 ENCOUNTER — OFFICE VISIT (OUTPATIENT)
Dept: CARDIOLOGY | Facility: CLINIC | Age: 24
End: 2024-01-22
Payer: COMMERCIAL

## 2024-01-22 VITALS
HEART RATE: 68 BPM | HEIGHT: 68 IN | SYSTOLIC BLOOD PRESSURE: 120 MMHG | BODY MASS INDEX: 22.73 KG/M2 | WEIGHT: 150 LBS | DIASTOLIC BLOOD PRESSURE: 72 MMHG

## 2024-01-22 DIAGNOSIS — G90.A POTS (POSTURAL ORTHOSTATIC TACHYCARDIA SYNDROME): Primary | ICD-10-CM

## 2024-01-22 DIAGNOSIS — R07.9 CHEST PAIN, UNSPECIFIED TYPE: ICD-10-CM

## 2024-01-22 PROCEDURE — 1036F TOBACCO NON-USER: CPT | Performed by: INTERNAL MEDICINE

## 2024-01-22 PROCEDURE — 93000 ELECTROCARDIOGRAM COMPLETE: CPT | Performed by: INTERNAL MEDICINE

## 2024-01-22 PROCEDURE — 99213 OFFICE O/P EST LOW 20 MIN: CPT | Performed by: INTERNAL MEDICINE

## 2024-01-22 NOTE — PROGRESS NOTES
Patient here to see Dr. Owen for follow up visit. 2 week holter monitor ordered for patient to wear for POTS syndrome. Monitor was applied to left upper chest area using application technique per [facility]. Patient was then thoroughly educated on how to use device. Patient will return device in 2 weeks through [employer], she shows understanding at this time. Registration sheet handed to  at check out, information documented in ZIO log book.       [id number]

## 2024-01-29 ENCOUNTER — APPOINTMENT (OUTPATIENT)
Dept: PRIMARY CARE | Facility: CLINIC | Age: 24
End: 2024-01-29
Payer: COMMERCIAL

## 2024-02-15 ENCOUNTER — HOSPITAL ENCOUNTER (OUTPATIENT)
Dept: NEUROLOGY | Facility: CLINIC | Age: 24
Discharge: HOME | End: 2024-02-15
Payer: COMMERCIAL

## 2024-02-15 DIAGNOSIS — G56.03 CARPAL TUNNEL SYNDROME ON BOTH SIDES: ICD-10-CM

## 2024-02-15 PROCEDURE — 95910 NRV CNDJ TEST 7-8 STUDIES: CPT | Performed by: PSYCHIATRY & NEUROLOGY

## 2024-02-15 PROCEDURE — 95885 MUSC TST DONE W/NERV TST LIM: CPT | Performed by: PSYCHIATRY & NEUROLOGY

## 2024-02-15 PROCEDURE — 95885 MUSC TST DONE W/NERV TST LIM: CPT | Mod: GC,59 | Performed by: PSYCHIATRY & NEUROLOGY

## 2024-02-15 PROCEDURE — 95886 MUSC TEST DONE W/N TEST COMP: CPT | Performed by: PSYCHIATRY & NEUROLOGY

## 2024-02-15 PROCEDURE — 95886 MUSC TEST DONE W/N TEST COMP: CPT | Mod: GC | Performed by: PSYCHIATRY & NEUROLOGY

## 2024-02-15 PROCEDURE — 95910 NRV CNDJ TEST 7-8 STUDIES: CPT | Mod: GC | Performed by: PSYCHIATRY & NEUROLOGY

## 2024-02-19 ENCOUNTER — ANCILLARY PROCEDURE (OUTPATIENT)
Dept: CARDIOLOGY | Facility: CLINIC | Age: 24
End: 2024-02-19
Payer: COMMERCIAL

## 2024-02-19 DIAGNOSIS — G90.A POTS (POSTURAL ORTHOSTATIC TACHYCARDIA SYNDROME): ICD-10-CM

## 2024-02-19 DIAGNOSIS — R07.9 CHEST PAIN, UNSPECIFIED TYPE: ICD-10-CM

## 2024-02-19 PROCEDURE — 93248 EXT ECG>7D<15D REV&INTERPJ: CPT | Performed by: STUDENT IN AN ORGANIZED HEALTH CARE EDUCATION/TRAINING PROGRAM

## 2024-02-19 PROCEDURE — 93246 EXT ECG>7D<15D RECORDING: CPT | Performed by: STUDENT IN AN ORGANIZED HEALTH CARE EDUCATION/TRAINING PROGRAM

## 2024-02-20 ENCOUNTER — TELEPHONE (OUTPATIENT)
Dept: PRIMARY CARE | Facility: CLINIC | Age: 24
End: 2024-02-20
Payer: COMMERCIAL

## 2024-02-20 ENCOUNTER — LAB (OUTPATIENT)
Dept: LAB | Facility: LAB | Age: 24
End: 2024-02-20
Payer: COMMERCIAL

## 2024-02-20 DIAGNOSIS — E87.8 ELECTROLYTE IMBALANCE: ICD-10-CM

## 2024-02-20 DIAGNOSIS — Z13.29 THYROID DISORDER SCREEN: ICD-10-CM

## 2024-02-20 DIAGNOSIS — Z13.220 SCREENING FOR HYPERLIPIDEMIA: ICD-10-CM

## 2024-02-20 DIAGNOSIS — E55.9 VITAMIN D DEFICIENCY: Primary | ICD-10-CM

## 2024-02-20 DIAGNOSIS — E55.9 VITAMIN D DEFICIENCY: ICD-10-CM

## 2024-02-20 DIAGNOSIS — E78.49 OTHER HYPERLIPIDEMIA: ICD-10-CM

## 2024-02-20 PROCEDURE — 80061 LIPID PANEL: CPT

## 2024-02-20 PROCEDURE — 80053 COMPREHEN METABOLIC PANEL: CPT

## 2024-02-20 PROCEDURE — 85027 COMPLETE CBC AUTOMATED: CPT

## 2024-02-20 PROCEDURE — 82306 VITAMIN D 25 HYDROXY: CPT

## 2024-02-20 PROCEDURE — 84443 ASSAY THYROID STIM HORMONE: CPT

## 2024-02-20 PROCEDURE — 83721 ASSAY OF BLOOD LIPOPROTEIN: CPT

## 2024-02-20 PROCEDURE — 36415 COLL VENOUS BLD VENIPUNCTURE: CPT

## 2024-02-20 PROCEDURE — 83735 ASSAY OF MAGNESIUM: CPT

## 2024-02-20 NOTE — TELEPHONE ENCOUNTER
Patient called in and stated     Hello. I see you tomorrow but In the mean time I realized I was taking in entirely too many electrolytes. I was super nauseous every day & couldn’t figure out why until I realized you can overdose on electrolytes? I was drinking a gallon atleast a day of electrolyte mix. I now know that this was horrible for me. i started drinking the aldi electrolyte water mix in November. I stopped yesterday completely and I can’t stop shaking and I feel off. I’m wondering if we need to check anything before I come in tomorrow, If you see this in time. I think my appt is after the labs closed so I didn’t know if I should do something today or maybe I should go to urgent care?      Thank you

## 2024-02-20 NOTE — TELEPHONE ENCOUNTER
Spoke to patient. She will try to have labs and will go to urgent care if symptoms do not resolve.

## 2024-02-21 ENCOUNTER — OFFICE VISIT (OUTPATIENT)
Dept: PRIMARY CARE | Facility: CLINIC | Age: 24
End: 2024-02-21
Payer: COMMERCIAL

## 2024-02-21 VITALS
WEIGHT: 151 LBS | BODY MASS INDEX: 22.96 KG/M2 | RESPIRATION RATE: 14 BRPM | SYSTOLIC BLOOD PRESSURE: 114 MMHG | HEART RATE: 78 BPM | DIASTOLIC BLOOD PRESSURE: 79 MMHG | OXYGEN SATURATION: 99 % | TEMPERATURE: 97.1 F

## 2024-02-21 DIAGNOSIS — K58.1 IRRITABLE BOWEL SYNDROME WITH CONSTIPATION: Primary | ICD-10-CM

## 2024-02-21 DIAGNOSIS — J45.20 MILD INTERMITTENT ASTHMA WITHOUT COMPLICATION (HHS-HCC): ICD-10-CM

## 2024-02-21 DIAGNOSIS — G56.03 BILATERAL CARPAL TUNNEL SYNDROME: ICD-10-CM

## 2024-02-21 DIAGNOSIS — E55.9 VITAMIN D DEFICIENCY: ICD-10-CM

## 2024-02-21 PROBLEM — E87.8 ELECTROLYTE IMBALANCE: Status: RESOLVED | Noted: 2024-02-20 | Resolved: 2024-02-21

## 2024-02-21 PROBLEM — R55 NEAR SYNCOPE: Status: RESOLVED | Noted: 2023-09-28 | Resolved: 2024-02-21

## 2024-02-21 PROBLEM — I95.1 ORTHOSTATIC HYPOTENSION: Status: RESOLVED | Noted: 2023-09-28 | Resolved: 2024-02-21

## 2024-02-21 PROBLEM — K59.09 CHRONIC CONSTIPATION: Status: RESOLVED | Noted: 2023-09-28 | Resolved: 2024-02-21

## 2024-02-21 LAB
25(OH)D3 SERPL-MCNC: 26 NG/ML (ref 30–100)
ALBUMIN SERPL BCP-MCNC: 4.7 G/DL (ref 3.4–5)
ALP SERPL-CCNC: 69 U/L (ref 33–110)
ALT SERPL W P-5'-P-CCNC: 11 U/L (ref 7–45)
ANION GAP SERPL CALC-SCNC: 13 MMOL/L (ref 10–20)
AST SERPL W P-5'-P-CCNC: 15 U/L (ref 9–39)
BILIRUB SERPL-MCNC: 0.5 MG/DL (ref 0–1.2)
BUN SERPL-MCNC: 14 MG/DL (ref 6–23)
CALCIUM SERPL-MCNC: 9.6 MG/DL (ref 8.6–10.6)
CHLORIDE SERPL-SCNC: 103 MMOL/L (ref 98–107)
CHOLEST SERPL-MCNC: 174 MG/DL (ref 0–199)
CHOLESTEROL/HDL RATIO: 2.7
CO2 SERPL-SCNC: 29 MMOL/L (ref 21–32)
CREAT SERPL-MCNC: 0.78 MG/DL (ref 0.5–1.05)
EGFRCR SERPLBLD CKD-EPI 2021: >90 ML/MIN/1.73M*2
ERYTHROCYTE [DISTWIDTH] IN BLOOD BY AUTOMATED COUNT: 12 % (ref 11.5–14.5)
GLUCOSE SERPL-MCNC: 63 MG/DL (ref 74–99)
HCT VFR BLD AUTO: 44.1 % (ref 36–46)
HDLC SERPL-MCNC: 65.1 MG/DL
HGB BLD-MCNC: 13.9 G/DL (ref 12–16)
LDLC SERPL CALC-MCNC: 80 MG/DL
LDLC SERPL DIRECT ASSAY-MCNC: 91 MG/DL (ref 0–129)
MAGNESIUM SERPL-MCNC: 2.22 MG/DL (ref 1.6–2.4)
MCH RBC QN AUTO: 28 PG (ref 26–34)
MCHC RBC AUTO-ENTMCNC: 31.5 G/DL (ref 32–36)
MCV RBC AUTO: 89 FL (ref 80–100)
NON HDL CHOLESTEROL: 109 MG/DL (ref 0–149)
NRBC BLD-RTO: 0 /100 WBCS (ref 0–0)
PLATELET # BLD AUTO: 295 X10*3/UL (ref 150–450)
POTASSIUM SERPL-SCNC: 4.1 MMOL/L (ref 3.5–5.3)
PROT SERPL-MCNC: 7.1 G/DL (ref 6.4–8.2)
RBC # BLD AUTO: 4.97 X10*6/UL (ref 4–5.2)
SODIUM SERPL-SCNC: 141 MMOL/L (ref 136–145)
TRIGL SERPL-MCNC: 143 MG/DL (ref 0–149)
TSH SERPL-ACNC: 1.9 MIU/L (ref 0.44–3.98)
VLDL: 29 MG/DL (ref 0–40)
WBC # BLD AUTO: 5.9 X10*3/UL (ref 4.4–11.3)

## 2024-02-21 PROCEDURE — 99213 OFFICE O/P EST LOW 20 MIN: CPT | Performed by: FAMILY MEDICINE

## 2024-02-21 PROCEDURE — 1036F TOBACCO NON-USER: CPT | Performed by: FAMILY MEDICINE

## 2024-02-21 RX ORDER — CHOLECALCIFEROL (VITAMIN D3) 50 MCG
2000 TABLET ORAL DAILY
COMMUNITY

## 2024-02-21 RX ORDER — ALBUTEROL SULFATE 90 UG/1
2 AEROSOL, METERED RESPIRATORY (INHALATION) EVERY 6 HOURS PRN
Qty: 18 G | Refills: 3 | Status: SHIPPED | OUTPATIENT
Start: 2024-02-21 | End: 2025-02-20

## 2024-02-21 ASSESSMENT — ENCOUNTER SYMPTOMS
ABDOMINAL PAIN: 0
CONFUSION: 0
SHORTNESS OF BREATH: 0
CHEST TIGHTNESS: 0
ARTHRALGIAS: 0
PALPITATIONS: 0
FEVER: 0
CHILLS: 0

## 2024-02-21 NOTE — PROGRESS NOTES
Subjective   Patient ID: Shasha De León is a 23 y.o. female who presents for Follow-up.    HPI today for follow-up of ongoing healthcare issues most part states she is doing well.  She is in the process of getting injections to see if they help with her carpal tunnel syndrome.  She has had follow-ups with cardiology as well and recently completed a 2-week Holter monitor she still waiting for the results.  She states her GI symptoms are much improved and are now manageable with lifestyle modifications between dietary adjustment as well as she is found that exercising seems to help a lot with her digestive symptoms as well.    Review of Systems   Constitutional:  Negative for chills and fever.   HENT:  Negative for congestion and ear pain.    Eyes:  Negative for visual disturbance.   Respiratory:  Negative for chest tightness and shortness of breath.    Cardiovascular:  Negative for chest pain and palpitations.   Gastrointestinal:  Negative for abdominal pain.   Musculoskeletal:  Negative for arthralgias.   Skin:  Negative for pallor.   Psychiatric/Behavioral:  Negative for confusion.        Objective   /79   Pulse 78   Temp 36.2 °C (97.1 °F)   Resp 14   Wt 68.5 kg (151 lb)   SpO2 99%   BMI 22.96 kg/m²     Physical Exam  Vitals and nursing note reviewed.   Constitutional:       General: She is not in acute distress.     Appearance: Normal appearance. She is not ill-appearing.   HENT:      Head: Normocephalic and atraumatic.      Right Ear: Tympanic membrane, ear canal and external ear normal.      Left Ear: Tympanic membrane, ear canal and external ear normal.      Mouth/Throat:      Pharynx: Oropharynx is clear.   Eyes:      Extraocular Movements: Extraocular movements intact.   Cardiovascular:      Rate and Rhythm: Normal rate and regular rhythm.      Pulses: Normal pulses.      Heart sounds: Normal heart sounds.   Pulmonary:      Effort: Pulmonary effort is normal.      Breath sounds: Normal breath  sounds.   Abdominal:      General: Abdomen is flat. Bowel sounds are normal.      Palpations: Abdomen is soft.      Tenderness: There is no abdominal tenderness.   Musculoskeletal:         General: Normal range of motion.      Cervical back: Neck supple.   Skin:     General: Skin is warm.   Neurological:      Mental Status: She is alert and oriented to person, place, and time. Mental status is at baseline.   Psychiatric:         Mood and Affect: Mood normal.       Recent Results (from the past 1008 hour(s))   CBC    Collection Time: 02/20/24  3:04 PM   Result Value Ref Range    WBC 5.9 4.4 - 11.3 x10*3/uL    nRBC 0.0 0.0 - 0.0 /100 WBCs    RBC 4.97 4.00 - 5.20 x10*6/uL    Hemoglobin 13.9 12.0 - 16.0 g/dL    Hematocrit 44.1 36.0 - 46.0 %    MCV 89 80 - 100 fL    MCH 28.0 26.0 - 34.0 pg    MCHC 31.5 (L) 32.0 - 36.0 g/dL    RDW 12.0 11.5 - 14.5 %    Platelets 295 150 - 450 x10*3/uL   Cholesterol, LDL Direct    Collection Time: 02/20/24  3:04 PM   Result Value Ref Range    LDL, Direct 91 0 - 129 mg/dL   Comprehensive Metabolic Panel    Collection Time: 02/20/24  3:04 PM   Result Value Ref Range    Glucose 63 (L) 74 - 99 mg/dL    Sodium 141 136 - 145 mmol/L    Potassium 4.1 3.5 - 5.3 mmol/L    Chloride 103 98 - 107 mmol/L    Bicarbonate 29 21 - 32 mmol/L    Anion Gap 13 10 - 20 mmol/L    Urea Nitrogen 14 6 - 23 mg/dL    Creatinine 0.78 0.50 - 1.05 mg/dL    eGFR >90 >60 mL/min/1.73m*2    Calcium 9.6 8.6 - 10.6 mg/dL    Albumin 4.7 3.4 - 5.0 g/dL    Alkaline Phosphatase 69 33 - 110 U/L    Total Protein 7.1 6.4 - 8.2 g/dL    AST 15 9 - 39 U/L    Bilirubin, Total 0.5 0.0 - 1.2 mg/dL    ALT 11 7 - 45 U/L   Magnesium    Collection Time: 02/20/24  3:04 PM   Result Value Ref Range    Magnesium 2.22 1.60 - 2.40 mg/dL   Lipid Panel    Collection Time: 02/20/24  3:04 PM   Result Value Ref Range    Cholesterol 174 0 - 199 mg/dL    HDL-Cholesterol 65.1 mg/dL    Cholesterol/HDL Ratio 2.7     LDL Calculated 80 <=119 mg/dL    VLDL 29 0  - 40 mg/dL    Triglycerides 143 0 - 149 mg/dL    Non HDL Cholesterol 109 0 - 149 mg/dL   Vitamin D 25-Hydroxy,Total (for eval of Vitamin D levels)    Collection Time: 24  3:04 PM   Result Value Ref Range    Vitamin D, 25-Hydroxy, Total 26 (L) 30 - 100 ng/mL   TSH with reflex to Free T4 if abnormal    Collection Time: 24  3:04 PM   Result Value Ref Range    Thyroid Stimulating Hormone 1.90 0.44 - 3.98 mIU/L     Recent labs reviewed with patient    Continue current medications supplement with vitamin D3 2000 units daily    Maintain active lifestyle as well as healthy diet  Return to office 6 months with repeat fasting labs      Assessment/Plan   Problem List Items Addressed This Visit             ICD-10-CM    Asthma J45.909     Stable refill albuterol inhaler previous has          Relevant Medications    albuterol 90 mcg/actuation inhaler    Other Relevant Orders    Comprehensive Metabolic Panel    Vitamin D 25-Hydroxy,Total (for eval of Vitamin D levels)    Follow Up In Primary Care - Established    Vitamin D deficiency E55.9     Slightly deficient supplement with vitamin D3 2000 units a day         Relevant Orders    Vitamin D 25-Hydroxy,Total (for eval of Vitamin D levels)    Bilateral carpal tunnel syndrome G56.03     Specialty report reviewed patient is scheduled for injections to see if it gives her symptomatic relief         Irritable bowel syndrome with constipation - Primary K58.1     Currently stable she finds that exercise seems to help.

## 2024-02-21 NOTE — ASSESSMENT & PLAN NOTE
Specialty report reviewed patient is scheduled for injections to see if it gives her symptomatic relief

## 2024-03-04 ENCOUNTER — OFFICE VISIT (OUTPATIENT)
Dept: NEUROLOGY | Facility: CLINIC | Age: 24
End: 2024-03-04
Payer: COMMERCIAL

## 2024-03-04 VITALS
WEIGHT: 158 LBS | RESPIRATION RATE: 18 BRPM | BODY MASS INDEX: 23.95 KG/M2 | HEART RATE: 75 BPM | HEIGHT: 68 IN | DIASTOLIC BLOOD PRESSURE: 80 MMHG | SYSTOLIC BLOOD PRESSURE: 115 MMHG

## 2024-03-04 DIAGNOSIS — G56.03 BILATERAL CARPAL TUNNEL SYNDROME: Primary | ICD-10-CM

## 2024-03-04 PROCEDURE — 99213 OFFICE O/P EST LOW 20 MIN: CPT | Performed by: PSYCHIATRY & NEUROLOGY

## 2024-03-04 PROCEDURE — 20526 THER INJECTION CARP TUNNEL: CPT | Performed by: PSYCHIATRY & NEUROLOGY

## 2024-03-04 PROCEDURE — 1036F TOBACCO NON-USER: CPT | Performed by: PSYCHIATRY & NEUROLOGY

## 2024-03-04 ASSESSMENT — PAIN SCALES - GENERAL: PAINLEVEL: 0-NO PAIN

## 2024-03-04 NOTE — PROGRESS NOTES
"NEUROMUSCULAR CLINIC - FOLLOW UP VISIT     Date of Service: 3/4/2024  Patient: Shasha De León  MRN: [id number]  Primary Care Physician: Bandar Palomo MD     History of Present Illness:    Ms. De León is a 23 y.o. right handed  female presenting for follow up of bilateral carpal tunnel syndrome for steroid injections.      Interval History:   Since last visit, her symptoms in the hands have worsened, as she is holding her baby more in the arms.    She also reports post-parandial nausea and dizziness. Her blood glucose was in 60s when checked by PCP. This happens almost every night after dinner. This is not similar to her POTS symptoms.      Prior History:   She developed numbness and tingling in both hands in 2021 when she was pregnant and was clinically diagnosed with bilateral carpal tunnel syndrome.  It improved slightly after her first delivery but worsened significantly with the second pregnancy in 2023 and has not resolved since. She reports tingling in both hands involving all fingers, worse at night and with arm flexed with mild weakness in both hands. The symptoms improve when she straightens her arm or shakes her hands.  EMG done on 2/15/2023 showed moderate bilateral median neuropathies across the wrists.    She also has history of POTS, symptoms of which are stable and get worse in the summers.      Physical Exam:     /80   Pulse 75   Resp 18   Ht 1.727 m (5' 8\")   Wt 71.7 kg (158 lb)   BMI 24.02 kg/m²     No thenar atrophy    MANUAL MUSCLE TESTING IS AS FOLLOWS:     R          L                         5          5          Shoulder abduction       5          5          Elbow flexion     5          5          Elbow extension            5          5          Wrist flexion      5          5          Wrist extension              5          5          Finger flexion     5          5          Finger extension            5          5          Finger abduction           5          5          " Thumb distal flexion      5          5          Thumb abduction                 Results:     The following labs, imaging, and results were personally reviewed and demonstrated:    Labs:    CBC:   Lab Results   Component Value Date    WBC 5.9 02/20/2024    HGB 13.9 02/20/2024    HCT 44.1 02/20/2024     02/20/2024       EMG Results:  2/15/2024:  EMG examination of right upper extremity and limited exam of the left upper extremity reveals evidence of moderate bilateral median mononeuropathies at the wrist (carpal tunnel syndromes), without active denervation of the thenar muscles.       PROCEDURE NOTE:     Procedure name: Carpal tunnel steroid injection, bilateral wrists  Methylprednisolone 80 mg / 1 ml each injection.  Lot # [id number]  Exp. 05/2024  Serial # [id number]    The volar aspect of the right wrist was prepared with Betadine. The palmaris longus tendon was identified and marked. The distal wrist crease was identified and marked. A 25 gauge needle was used to enter the carpal tunnel, approximately 1cm proximal to the distal wrist crease and ulnar to the palmaris longus tendon. No pain was noted on finger flexion and extension. The needle was advanced through the carpal tunnel without any difficulty. 1 mL of methylprednisolone (80mg)  was injected after aspiration. The medications flowed freely without any difficulty.     The same procedure was then done to the left wrist also using 1 mL of methylprednisolone (80mg) with no elucidation of paresthesias.     After the procedure, the patient clenched and unclenched the fingers of both hands for a period of two minutes to distribute the medication. There were no complications throughout the procedure.    The patient has been asked to report to us any redness, swelling, inflammation, or fevers.       Impression/Plan:     Impression:  Shasha De León is a 23 y.o. female with moderate bilateral carpal tunnel syndrome for 3 years, worsened after  recent delivery in 2023. She received steroid injection in bilateral wrists today. She tolerated the procedure well.    We discussed that if she does not get significant improvement, or if the symptoms recur we can repeat the injection once again before referring her to a hand surgeon.     She will call the office to update us.     .  Problem List Items Addressed This Visit       Bilateral carpal tunnel syndrome - Primary        Sara Melara MD  Neuromuscular Fellow  Neurology, Neuromuscular Division  [facility]    ATTENDING NOTE - CHERRIE DUNHAM M.D.    I saw patient with trainee and agree with the edits, history and exam that I helped formulate per above.       I saw patient and agree with above.  I assisted and was present during the procedure.    Cherrie Dunham M.D., F.A.C.P.   Director, Neuromuscular Center & EMG laboratory   [facility]   [facility]   Professor of Neurology   [employer]    The total appointment time today was 20 minutes. Time included preparing to see the patient, obtaining the history, performing a medically necessary appropriate physical examination, counseling and educating the patient, and documenting clinical information in the medical record.

## 2024-03-12 ENCOUNTER — OFFICE VISIT (OUTPATIENT)
Dept: PRIMARY CARE | Facility: CLINIC | Age: 24
End: 2024-03-12
Payer: COMMERCIAL

## 2024-03-12 VITALS
BODY MASS INDEX: 22.5 KG/M2 | SYSTOLIC BLOOD PRESSURE: 114 MMHG | RESPIRATION RATE: 14 BRPM | OXYGEN SATURATION: 98 % | DIASTOLIC BLOOD PRESSURE: 76 MMHG | HEART RATE: 69 BPM | WEIGHT: 148 LBS | TEMPERATURE: 96.9 F

## 2024-03-12 DIAGNOSIS — F41.1 GENERALIZED ANXIETY DISORDER: Primary | ICD-10-CM

## 2024-03-12 PROCEDURE — 1036F TOBACCO NON-USER: CPT | Performed by: FAMILY MEDICINE

## 2024-03-12 PROCEDURE — 99213 OFFICE O/P EST LOW 20 MIN: CPT | Performed by: FAMILY MEDICINE

## 2024-03-12 RX ORDER — SERTRALINE HYDROCHLORIDE 25 MG/1
25 TABLET, FILM COATED ORAL DAILY
Qty: 30 TABLET | Refills: 1 | Status: SHIPPED | OUTPATIENT
Start: 2024-03-12 | End: 2024-04-29 | Stop reason: WASHOUT

## 2024-03-12 ASSESSMENT — ENCOUNTER SYMPTOMS
TREMORS: 1
DIZZINESS: 0
HEADACHES: 0
WEAKNESS: 0
RESPIRATORY NEGATIVE: 1
NUMBNESS: 0
PALPITATIONS: 1
CONSTITUTIONAL NEGATIVE: 1
NERVOUS/ANXIOUS: 1
SEIZURES: 0
SPEECH DIFFICULTY: 0

## 2024-03-12 NOTE — PROGRESS NOTES
"Subjective   Patient ID: Shasha De León is a 23 y.o. female who presents for Tremors, Night Sweats, and Palpitations (\"Rocking\" movements , focus issues).    Tremor  Palpitations   Associated symptoms include anxiety. Pertinent negatives include no dizziness, numbness or weakness.    patient in today with a variety of complaints such as palpitations upper extremity tremors occasional night sweats trouble focusing.  She has seen cardiology workup unremarkable.  She seen neurology and has had upper extremity EMG performed which showed carpal tunnel syndrome bilaterally she received cortisone injections for this.  She says she has occasional lightheadedness.  Diagnostic testing thus far has not revealed any acute issues.  When asked if she feels anxious she says she is not sure but maybe denies depression.  She is a new mother and she is breast-feeding.    Review of Systems   Constitutional: Negative.    HENT: Negative.     Respiratory: Negative.     Cardiovascular:  Positive for palpitations.   Neurological:  Positive for tremors. Negative for dizziness, seizures, syncope, speech difficulty, weakness, numbness and headaches.   Psychiatric/Behavioral:  Negative for self-injury and suicidal ideas. The patient is nervous/anxious.        Objective   /76   Pulse 69   Temp 36.1 °C (96.9 °F)   Resp 14   Wt 67.1 kg (148 lb)   SpO2 98%   BMI 22.50 kg/m²     Physical Exam  Vitals and nursing note reviewed.   Constitutional:       General: She is not in acute distress.     Appearance: Normal appearance. She is not ill-appearing.   HENT:      Head: Normocephalic and atraumatic.      Right Ear: Tympanic membrane, ear canal and external ear normal.      Left Ear: Tympanic membrane, ear canal and external ear normal.      Mouth/Throat:      Pharynx: Oropharynx is clear.   Eyes:      Extraocular Movements: Extraocular movements intact.   Cardiovascular:      Rate and Rhythm: Normal rate and regular rhythm.      Pulses: " Normal pulses.      Heart sounds: Normal heart sounds.   Pulmonary:      Effort: Pulmonary effort is normal.      Breath sounds: Normal breath sounds.   Abdominal:      General: Abdomen is flat. Bowel sounds are normal.      Palpations: Abdomen is soft.      Tenderness: There is no abdominal tenderness.   Musculoskeletal:         General: Normal range of motion.      Cervical back: Neck supple.   Skin:     General: Skin is warm.   Neurological:      General: No focal deficit present.      Mental Status: She is alert and oriented to person, place, and time. Mental status is at baseline.      Motor: No weakness.      Coordination: Coordination normal.      Gait: Gait normal.   Psychiatric:         Mood and Affect: Mood normal.     Discussed the case with the patient reviewed a variety of various symptoms she mentions including previous evaluations that she has done.  We discussed the possibility that she may have underlying anxiety which may explain some of the physical things that she is noticing.  We discussed starting an antianxiety medicine to see if it would help but she is in agreement and is willing to try.    Start sertraline 25 mg once a day  Call if any questions or concerns otherwise return to our office in 4 to 6 weeks to reassess her case.    Assessment/Plan   Problem List Items Addressed This Visit             ICD-10-CM    Generalized anxiety disorder - Primary F41.1     Start Zoloft 25 mg once a day risk-benefit side effects reviewed she verbalizes understanding.         Relevant Medications    sertraline (Zoloft) 25 mg tablet    Other Relevant Orders    Follow Up In Primary Care - Established

## 2024-04-24 ENCOUNTER — APPOINTMENT (OUTPATIENT)
Dept: PRIMARY CARE | Facility: CLINIC | Age: 24
End: 2024-04-24
Payer: COMMERCIAL

## 2024-04-29 ENCOUNTER — PROCEDURE VISIT (OUTPATIENT)
Dept: NEUROLOGY | Facility: CLINIC | Age: 24
End: 2024-04-29
Payer: COMMERCIAL

## 2024-04-29 ENCOUNTER — HOSPITAL ENCOUNTER (OUTPATIENT)
Dept: RADIOLOGY | Facility: EXTERNAL LOCATION | Age: 24
Discharge: HOME | End: 2024-04-29
Payer: COMMERCIAL

## 2024-04-29 ENCOUNTER — OFFICE VISIT (OUTPATIENT)
Dept: NEUROLOGY | Facility: CLINIC | Age: 24
End: 2024-04-29
Payer: COMMERCIAL

## 2024-04-29 DIAGNOSIS — G56.03 BILATERAL CARPAL TUNNEL SYNDROME: Primary | ICD-10-CM

## 2024-04-29 PROCEDURE — 20526 THER INJECTION CARP TUNNEL: CPT | Performed by: PSYCHIATRY & NEUROLOGY

## 2024-04-29 PROCEDURE — 96372 THER/PROPH/DIAG INJ SC/IM: CPT | Performed by: PSYCHIATRY & NEUROLOGY

## 2024-04-29 PROCEDURE — 76882 US LMTD JT/FCL EVL NVASC XTR: CPT | Performed by: PSYCHIATRY & NEUROLOGY

## 2024-04-29 PROCEDURE — 99213 OFFICE O/P EST LOW 20 MIN: CPT | Performed by: PSYCHIATRY & NEUROLOGY

## 2024-04-29 RX ADMIN — METHYLPREDNISOLONE ACETATE 40 MG: 40 INJECTION, SUSPENSION INTRA-ARTICULAR; INTRALESIONAL; INTRAMUSCULAR; SOFT TISSUE at 12:42

## 2024-04-29 RX ADMIN — METHYLPREDNISOLONE ACETATE 80 MG: 80 INJECTION, SUSPENSION INTRA-ARTICULAR; INTRALESIONAL; INTRAMUSCULAR; SOFT TISSUE at 12:45

## 2024-04-29 NOTE — PROGRESS NOTES
NEUROMUSCULAR ULTRASOUND OF THE BILATERAL UPPER EXTREMITIES    INDICATION:  Clinical Information: Evaluate for bilateral carpal tunnel syndrome.  Symptoms started with numbness and tingling in both hands in 2021 while she was pregnant, that improved slightly after delivery, though continues to persist.  Improved with recent carpal tunnel injection 2 months ago, though symptoms are starting back worse on the right.      Neuromuscular ultrasound to be performed:  (a) to evaluate the echotexture and size of the right and left median nerves in the limb with attention to the carpal tunnel;   (b) to assess for any identifiable structural source of right and left median nerve compression;   (c) to assess adjacent structures to the median nerves;  (d) to assess the right and left wrists joints for abnormalities.     HEIGHT: 5 ft 8 in  WEIGHT: 145 lbs.  HANDEDNESS: Right    COMPARISON:  None.    TECHNIQUE:  The bilateral median nerves and accompanying structures were studied throughout their entire anatomic course in the limb from the wrist to the axilla, including real-time cine imaging. The examination was performed using a RED [employer] P9 ultrasound machine with a 15-6 MHz matrix linear transducer. Both axial and longitudinal views were obtained. The patient was examined supine with the arm extended and supinated.  Cross sectional area (CSA) was measured within the epineurium, using the trace method. At locations where repeat measurements were taken, the mean value is reported below. Transverse and longitudinal images were obtained.     FINDINGS:  At the right wrist at the distal wrist crease (proximal carpal tunnel), the median nerve CSA was 23.2 mm2 (NL < 10 mm2; borderline 10-13 mm2; ABN > 13 mm2).    The flattening ratio of the right median nerve (ratio of width / height of median nerve in the short- axis view) was 3.4 (NL < 3).    The echogenicity of the right median nerve at the wrist was mildly reduced. The mobility of the  right median nerve with flexion and extension of the fingers was mildly reduced. Color Doppler of the right median nerve showed normal median nerve vascularity.  No abnormal tenosynovitis of the right flexor tendons was noted].     Using a longitudinal scan of the right median nerve at the wrist, a notch sign was seen under the flexor retinaculum.     A right persistent median artery was not present. A right bifid median nerve was not present. No other abnormal mass or ganglia was appreciated in the right carpal tunnel. With extension of the fingers, there was mild intrusion of the right flexor digitorum sublimis. With flexion of the fingers, there was no abnormal intrusion of the right lumbrical muscles.    In the mid-forearm, approximately 12 cm proximal to the distal wrist crease, the right median nerve was seen between the flexor digitorum sublimis and flexor digitorum profundus muscles. At the mid-forearm, the right median nerve CSA was 6.1 mm2. The ratio of the right median CSAs between the wrist and forearm (WFR) was 3.8 (NL < 1.5; borderline: 1.5 - 2.0). The echogenicity of the right median nerve in the forearm was normal.    The right median nerve was then followed proximal into the forearm and then to the mid-arm. The median nerve was normal in size and echogenicity adjacent to the brachial artery.     Scanning the muscles, the echogenicity was normal of the flexor digitorum sublimis, flexor digitorum profundus, flexor pollicis longus, flexor carpi radialis, and pronator teres. The echogenicity of the abductor pollicis brevis was normal.    At the right wrist joint, the radial carpal joint was normal. No abnormal effusion was seen. The flexor carpi radialis tendon was normal. Both the radial and ulnar arteries were well seen and were normal.    Attention was then turned to the left side. At the left wrist at the distal wrist crease (proximal carpal tunnel), the median nerve CSA was 33.4 mm2 (NL < 10 mm2;  borderline 10-13 mm2; ABN > 13 mm2).     The flattening ratio of the left median nerve (ratio of width / height of median nerve in the short- axis view) was 2.9 (NL < 3).    The echogenicity of the left median nerve at the wrist was reduced. The mobility of the left median nerve with flexion and extension of the fingers was reduced. Color Doppler of the left median nerve showed normal median nerve vascularity.  No abnormal tenosynovitis of the left flexor tendons was noted.    Using a longitudinal scan of the left median nerve at the wrist, a notch sign was seen under the flexor retinaculum.    A left persistent median artery was not present. A left bifid median nerve was not present. No other abnormal mass or ganglia was appreciated in the left carpal tunnel. With extension of the fingers, there was no abnormal intrusion of the left flexor digitorum sublimis. With flexion of the fingers, there was no abnormal intrusion of the left lumbrical muscles.    In the mid-forearm, approximately 12 cm proximal to the distal wrist crease, the left median nerve was seen between the flexor digitorum sublimis and flexor digitorum profundus muscles. At the mid-forearm, the left median nerve CSA was 5.8 mm2. The ratio of the left median CSAs between the wrist and forearm (WFR) was 5.7 (NL < 1.5; borderline: 1.5 - 2.0). The echogenicity of the left median nerve in the forearm was normal.    The left median nerve was then followed proximal into the forearm and then to the mid-arm. The median nerve was normal in size and echogenicity adjacent to the brachial artery.     Scanning the muscles, the echogenicity was normal of the flexor digitorum sublimis, flexor digitorum profundus, flexor pollicis longus, flexor carpi radialis, and pronator teres. The echogenicity of the abductor pollicis brevis was normal.    At the left wrist joint, the radial carpal joint was normal. No abnormal effusion was seen. The flexor carpi radialis tendon  was normal. Both the radial and ulnar arteries were well seen and were normal.    IMPRESSION:  This is a severely abnormal neuromuscular ultrasound examination of the right and left upper extremities.     There was ultrasound evidence of a severe median neuropathy at the right wrist.  In addition, the right median nerve was followed through its anatomic course in the limb from wrist to mid-arm and was normal above the wrist.    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  In addition, the left median nerve was followed through its anatomic course in the limb from wrist to mid-arm and was normal above the wrist.    The other visualized osseous, ligamentous, joint and tendinous structures appeared normal.    Jamari Craig MD  Neuromuscular Fellow    Dru Villavicencio MD, FACP  Neuromuscular Attending      Performed by: Jamari Craig MD  Authorized by: Dru Hernandez MD

## 2024-04-29 NOTE — PROGRESS NOTES
Date of Service: 4/29/2024  Patient: Shasha De León  MRN: [id number]      History of Present Illness:    Ms. De León is a 23 y.o. female who returns today for follow up for recurrent bilateral carpal tunnel syndromes.  We last saw her in early March 2024     Interval History:   During her last visit on March 30, 2024, we injected her carpal tunnels with Depo-Medrol 80 mg each with no complications.  She reported her symptoms improved but never disappeared.  She estimated that her symptoms improved at about 75%.  However they seem to have returned more recently particularly, as she is holding her baby more in the arms.      Prior History:   To recap, she developed numbness and tingling in both hands in 2021 when she was pregnant and was clinically diagnosed with bilateral carpal tunnel syndrome.  It improved slightly after her first delivery but worsened significantly with the second pregnancy in 2023 and has not resolved since. She reports tingling in both hands involving all fingers, worse at night and with arm flexed with mild weakness in both hands. The symptoms improve when she straightens her arm or shakes her hands.  EMG done on 2/15/2023 showed moderate bilateral median neuropathies across the wrists.     She also has history of POTS, symptoms of which are stable and get worse in the summers.     Neuromuscular Exam:      She has positive Phalen sign bilaterally.  Tinel signs are easily elicited by light percussion of the median nerve at the wrist.  She has no thenar atrophy.  She has slight reduction in pinprick sensation over the median distribution bilaterally.      Results:      We personally reviewed the images of her neuromuscular ultrasound today.  She had enlarged median nerves bilaterally at the wrists with flattening of the nerves and notch signs.  The thenar muscles were normal.  The median nerve is normal proximally.    Diagnosis:     1. Bilateral carpal tunnel syndrome  Point of Care  Neuromuscular US           Procedure:     Informed consent was obtained from the patient.     The volar aspect of the right wrist was prepared with povidone iodine and alcohol and rested on a pillow. The palmaris longus tendon was identified and marked and so was the distal wrist crease. A 27 gauge needle was used to enter the carpal tunnel, approximately 1/2 inch proximal to the distal wrist crease and medial to the palmaris longus tendon. The needle was advanced to the carpal tunnel without any difficulty. 1 mL of 80 mg/mL methylprednisolone was injected into the right carpal tunnel. The medication flowed freely without any difficulty.     The same procedure was then done to the left wrist also using 1 mL of 40 mg/mL methylprednisolone. After the procedure, the patient clenched and unclenched the fingers of both hands for a period of two minutes to distribute the medication. There were no complications throughout the procedure.    80mg/mL vial - LOT number [id number]; Exp date 05/2024  40mg/mL vial - LOT number [id number]; Exp date 07/2024    Jamari Craig MD  Neuromuscular fellow    I personally observed and assisted in performing the above procedures.  This was done with no complication.    Dru Hernandez M.D., F.A.C.P.    Impression/Plan:     Mrs. Shasha De León is a 23 y.o. young woman with recurrent moderate bilateral carpal tunnel syndromes, starting during her first pregnancy almost 3 years, and worsening after second delivery in 2023. She received steroid injection in bilateral wrists in early March 2024 and improved.  We performed a repeated steroid injections today bilaterally.  She tolerated the procedure well.     We discussed that if she does not get significant improvement, or if the symptoms recur, she will need a referral to a hand surgeon to consider carpal tunnel release.  She will call for any question.  Otherwise, she will return as needed.    Dru Hernandez M.D., F.A.C.P.   Director,  Neuromuscular Center & EMG laboratory   [facility]   [facility]   Professor of Neurology   [facility] [employer]       The total appointment time today was 40 minutes. Time included preparing to see the patient, obtaining the history, performing a medically necessary appropriate physical examination, counseling and educating the patient, ordering tests, referring and communicating with other providers, independently interpreting results  to the patient/family and documenting clinical information in the medical record.

## 2024-06-10 RX ORDER — METHYLPREDNISOLONE ACETATE 40 MG/ML
40 INJECTION, SUSPENSION INTRA-ARTICULAR; INTRALESIONAL; INTRAMUSCULAR; SOFT TISSUE ONCE
Status: COMPLETED | OUTPATIENT
Start: 2024-06-10 | End: 2024-04-29

## 2024-06-10 RX ORDER — METHYLPREDNISOLONE ACETATE 80 MG/ML
80 INJECTION, SUSPENSION INTRA-ARTICULAR; INTRALESIONAL; INTRAMUSCULAR; SOFT TISSUE ONCE
Status: COMPLETED | OUTPATIENT
Start: 2024-06-10 | End: 2024-04-29

## 2024-06-12 ENCOUNTER — DOCUMENTATION (OUTPATIENT)
Dept: NEUROLOGY | Facility: HOSPITAL | Age: 24
End: 2024-06-12
Payer: COMMERCIAL

## 2024-06-12 DIAGNOSIS — G90.A POTS (POSTURAL ORTHOSTATIC TACHYCARDIA SYNDROME): Primary | ICD-10-CM

## 2024-06-12 RX ORDER — MIDODRINE HYDROCHLORIDE 2.5 MG/1
2.5 TABLET ORAL
Qty: 90 TABLET | Refills: 5 | Status: SHIPPED | OUTPATIENT
Start: 2024-06-12 | End: 2024-12-09

## 2024-06-12 NOTE — PROGRESS NOTES
For full details on history, refer to 4/29/2024 and 1/3/2024 Neurology Clinic notes.   This is a patient with POTS and history of bilateral carpal tunnel, for which she received steroid injections for in April 2024.   Her POTS symptoms worsen in the summer months and in the heat.    She left a message 5/30 reporting return and worsening of her POTS symptoms, particularly when going outside: dizzy/lightheaded and the sensation of shortness of breath. She has children at home and has to go outside frequently, and the hot weather really seems to exacerbate these symptoms.     She also reported episodes of overwhelming feeling of sadness or anger, despite not thinking about anything and no clear triggers. She does not feel it is consistent with her typical anxiety.      In addition, she feels she is developed worsening joint pains for which she looking to be evaluated by rheumatology.     We discussed the following plan:  - Due to mood issues, opted to defer propranolol. Will plan to start midodrine 2.5mg three times daily for 3-4 weeks. She will call with an update on how she is feeling on this medication. There is room to increase this medication. We discussed it is unclear if midodrine is expressed in breast milk as she is still breastfeeding, however, she plans to wean over the next month.     - She will look into making an appointment with psychiatry to discuss her recent mood changes    - She will discuss with Rheumatology regarding her joint pains.     Jamari Craig MD  Neuromuscular Fellow    Discussed with Dr. Dru Hernandez, Neuromuscular Attending.

## 2024-06-21 ENCOUNTER — TELEPHONE (OUTPATIENT)
Dept: RHEUMATOLOGY | Facility: CLINIC | Age: 24
End: 2024-06-21
Payer: COMMERCIAL

## 2024-06-21 NOTE — TELEPHONE ENCOUNTER
Patient wants to know if she can have repeat lab work before her next appointment with Dr. Palma on Wednesday. Please advise

## 2024-06-21 NOTE — TELEPHONE ENCOUNTER
Return call placed to patient to make aware of message being received and forwarded over to provider. Unable to establish contact and voicemail left making patient aware that she does not have labs placed as of yet, but that sometimes can order at the conclusion of an appointment. No other concerns to address.

## 2024-06-26 ENCOUNTER — OFFICE VISIT (OUTPATIENT)
Dept: RHEUMATOLOGY | Facility: CLINIC | Age: 24
End: 2024-06-26
Payer: COMMERCIAL

## 2024-06-26 VITALS
DIASTOLIC BLOOD PRESSURE: 72 MMHG | BODY MASS INDEX: 21.67 KG/M2 | HEART RATE: 65 BPM | HEIGHT: 68 IN | TEMPERATURE: 98.2 F | WEIGHT: 143 LBS | SYSTOLIC BLOOD PRESSURE: 118 MMHG

## 2024-06-26 DIAGNOSIS — M35.7 HYPERMOBILITY SYNDROME: Primary | ICD-10-CM

## 2024-06-26 DIAGNOSIS — G90.A POSTURAL ORTHOSTATIC TACHYCARDIA SYNDROME: ICD-10-CM

## 2024-06-26 DIAGNOSIS — M54.50 CHRONIC LOW BACK PAIN WITHOUT SCIATICA, UNSPECIFIED BACK PAIN LATERALITY: ICD-10-CM

## 2024-06-26 DIAGNOSIS — G89.29 CHRONIC LOW BACK PAIN WITHOUT SCIATICA, UNSPECIFIED BACK PAIN LATERALITY: ICD-10-CM

## 2024-06-26 PROCEDURE — 99214 OFFICE O/P EST MOD 30 MIN: CPT | Mod: GC | Performed by: STUDENT IN AN ORGANIZED HEALTH CARE EDUCATION/TRAINING PROGRAM

## 2024-06-26 RX ORDER — ONDANSETRON 4 MG/1
TABLET, FILM COATED ORAL
COMMUNITY
Start: 2024-05-09

## 2024-06-26 RX ORDER — OMEPRAZOLE 40 MG/1
CAPSULE, DELAYED RELEASE ORAL
COMMUNITY
Start: 2024-05-09

## 2024-06-26 ASSESSMENT — PAIN SCALES - GENERAL: PAINLEVEL: 4

## 2024-06-26 NOTE — PROGRESS NOTES
Rheumatology Office Visit      Subjective     Patient ID: [id number]     Shasha De León is a 23 y.o. female who presents for Follow-up.    Subjective    Shasha De León is a 24 yo F w/a history of anxiety, POTS (following COVID19), b/l CTS (started during pregnancy) presenting for follow-up. Initially seen by Rheumatology (Dr. 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 Beighton score 6 at that time c/w ligamentous laxity. Labs including inflammatory markers, PRICE/ARNOLD panel normal at that time. Saw neurology 06/12/24 at which time she reported worsening joint pain which is why she's here today. XR knee previously normal.    Today patient states she's had worsening joint pain involving all joints. Has had back and hip problems since she was 11. Now has aching in the feet and ankle, knees especially at night. Legs feel very heavy. Pain progresses throughout the day. Morning is the best time of day in terms of joint pain. Also notes rash throughout the body including on the face that's worse in the sun. No hx of PsO. If she gets an injury it gets very large and takes a long time to heal. Endorses hair loss as well which was previously attributed to pregnancy but she is almost done breastfeeding, last delivery 05/11/2023. EGD apparently showed nonspecific inflammation in the small intestine (we do not have access to path). Reports sores in the nose, no oral or genital ulcers. Frequent headaches and hx migraines. Reports stomach cramping, fatty stools, early satiety, bloating. Feels it's difficult to swallow. Feels she has difficulty regulating body temperature. Reports mood swings but attributes this to her pain. Has had GI problems for as long as she can remember. They went away for a while but returned with 1st pregnancy. Has severe fatigue. Has tingling in the R foot, next to the L knee. Joint symptoms have gotten worse the past 2 mos. No buttock pain.    No family history of autoimmunity    Review of  "Systems  As per HPI     Objective  Temp: 36.8 °C (98.2 °F)  Heart Rate: 65  BP: 118/72    Physical Exam  General: Cooperative, in NAD   Eyes: Conjunctiva clear, sclera white, anicteric   Nose: No external deformity, no mucosal crusting or signs of bleeding   Throat/Mouth: Moist mucous membranes, normal dentition, no ulcers or lesions   Lungs: No respiratory distress; lungs CTAB; no wheezes, rales, rhonchi, or stridor   Heart: Normal S1 and S2; regular rate and rhythm; no murmurs, rubs, or gallops  Skin: No rashes, ulcers, tophi, or nodules noted  MSK:   Spine: Normal posture, no point tenderness to palpation, normal ROM  Upper Extremities:   Hands: No erythema, warmth, synovitis, or pain of the DIPs, PIPs, or MCPs; normal ROM  Wrists: No erythema, warmth, synovitis, or pain of the wrists; normal ROM  Elbows: No erythema, warmth, synovitis, or pain; normal ROM   Shoulders: No erythema, warmth, appreciable swelling, or pain; normal ROM   Lower Extremities:   Hips: No gross deformity, erythema, warmth, or pain; normal ROM   Knees: No erythema, warmth, swelling, or pain; normal ROM   Ankles: No erythema, warmth, synovitis, or pain; normal ROM  Feet: No gross deformity, erythema, or swelling; MTP squeeze negative bilaterally    Last BMP:   Lab Results   Component Value Date     02/20/2024    K 4.1 02/20/2024     02/20/2024    CO2 29 02/20/2024    BUN 14 02/20/2024    CREATININE 0.78 02/20/2024    CALCIUM 9.6 02/20/2024     Last CBC: No components found for: \"CBC\"  Last CRP:   C-Reactive Protein (mg/dL)   Date Value   10/05/2023 <0.10     Last ESR:   Sedimentation Rate (mm/h)   Date Value   10/05/2023 2     Last Hepatic Function Results:   Bilirubin, Total (mg/dL)   Date Value   02/20/2024 0.5     ALT (U/L)   Date Value   02/20/2024 11     AST (U/L)   Date Value   02/20/2024 15     Alkaline Phosphatase (U/L)   Date Value   02/20/2024 69     Albumin (g/dL)   Date Value   02/20/2024 4.7     Total Protein (g/dL) "   Date Value   02/20/2024 7.1       Assessment/Plan   Diagnoses and all orders for this visit:  Hypermobility syndrome  Postural orthostatic tachycardia syndrome    Shasha De León is a 22 yo F w/a history of anxiety, POTS (following COVID19), b/l CTS (started during pregnancy) presenting for follow-up.     Follow-up PRN    Patient seen and discussed with attending Dr. Hemant Palma MD  PGY-V, Rheumatology    Teaching Statement    Patient interviewed and examined with Fellow.  I personally verified the key and critical portions of the history and physical examination and reviewed the documentation. History and physical as recorded above. I agree with the assessment and plan of the fellow.  Possible hypermobile joints causing arthralgia's.  Has back pain since age 11.  Not inflammatory in nature. Will check Xray of the lumbar spine and pelvis.    Reviewed and approved by JACINTA IQBAL on 6/26/24 at 4:07 PM.

## 2024-06-29 NOTE — PROGRESS NOTES
[facility]  Hand and Upper Extremity Service  Initial evaluation / Consultation         Consult requested by Referring Physician: Dr. Hernandez    Chief Complaint: Bilateral hand numbness       23 y.o female presenting for bilateral hand numbness.  Initially began in 2021 during 1st pregnancy and has been persistent since then with fluctuations in severity.  She denies any sleep disturbances.  She was diagnosed with carpal tunnel syndrome based on EMG testing earlier this year which just simply confirm the diagnosis that had been made on physical examination.  She has had 2 prior rounds of steroid injections with short-term relief.  Those injections were provided by her referring physician.    Past medical history significant for POTS, EDS and prediabetes           Please refer to New Patient Intake Form scanned into patient's electronic record for self reported past medical history, past surgical history, medications, allergies, family history, social history and 10 point review of systems    Examination:  Constitutional: Oriented to person, place, and time.  Appears well-developed and well-nourished.  Head: Normocephalic and atraumatic.  Eyes: Pupils are equal, round, and reactive to light.  Cardiovascular: Intact distal pulses.  Pulmonary/Chest/Breast: Effort normal. No respiratory distress.  Neurological: Alert and oriented to person, place, and time.  Skin: Skin is warm and dry.  Psychiatric: normal mood and affect.  Behavior is normal.  Musculoskeletal: Bilateral hand examination reveals normal appearance.  No significant swelling skin change or deformity.  No thenar or intrinsic muscle atrophy.  Full digital flexion without triggering.  Diminished sensation median nerve distribution.  Positive Xochitl median nerve compression test bilaterally.           Personal Interpretation of Diagnostic studies: EMG study dated February 15, 2024 reveals evidence of bilateral moderately  severe carpal tunnel syndrome without denervation       Impression: Bilateral carpal tunnel syndrome      Plan: Diagnosis is fairly clear.  She has had injections with only short-term relief.  She is referred here for surgical consultation.  We have discussed surgery in detail including anticipated return to activities.  She does have 2 young children but I think that she should be able to return to most of her childcare responsibilities and activities very quickly after surgery.  Patient is indicated that Dr. Hernandez suggested a biopsy of some sort during procedure.  I will reach out to him for more specifics of what he is looking for.  I think that she is at low risk for the diagnosis of amyloidosis but we can certainly obtain synovial biopsy of the flexor sheath if necessary.  She will contact my office when she is ready to proceed with carpal tunnel decompression.  She needs indicated with side she wants to do first and whether or not she wants to do this under local or IV sedation.      In Office Procedures Performed: None      Medications Prescribed: None       Follow up: She will call to schedule surgery    For Surgical Planning:  Diagnosis: Bilateral carpal tunnel syndrome  Procedure: Carpal tunnel release (patient indicate which side), tenosynovial biopsy  CPT: 53366, 21226  Anesthesia: MAC versus local, patient to decide  Duration: 25 minutes  Special Equipment Needed: None  Medical Notes / PM / DM / PAT needed?:  N/A  Location: [facility] or [place]  Initial Post Operative Visit: 2 weeks           Eldon Dewitt MD  [facility]  Department of Orthopaedic Surgery  Hand and Upper Extremity Reconstruction      Scribe Attestation  By signing my name below, IKeyana , Scribbrenda   attest that this documentation has been prepared under the direction and in the presence of Dr. Eldon Dewitt.      Dictation performed with the use of voice recognition software.  Syntax and  grammatical errors may exist.

## 2024-07-01 ENCOUNTER — APPOINTMENT (OUTPATIENT)
Dept: ORTHOPEDIC SURGERY | Facility: CLINIC | Age: 24
End: 2024-07-01
Payer: COMMERCIAL

## 2024-07-01 VITALS — BODY MASS INDEX: 21.67 KG/M2 | WEIGHT: 143 LBS | HEIGHT: 68 IN

## 2024-07-01 DIAGNOSIS — G56.03 CARPAL TUNNEL SYNDROME, BILATERAL: Primary | ICD-10-CM

## 2024-07-01 PROCEDURE — 99203 OFFICE O/P NEW LOW 30 MIN: CPT | Performed by: ORTHOPAEDIC SURGERY

## 2024-07-01 PROCEDURE — 1036F TOBACCO NON-USER: CPT | Performed by: ORTHOPAEDIC SURGERY

## 2024-07-01 ASSESSMENT — PAIN - FUNCTIONAL ASSESSMENT: PAIN_FUNCTIONAL_ASSESSMENT: 0-10

## 2024-07-01 ASSESSMENT — PAIN SCALES - GENERAL: PAINLEVEL_OUTOF10: 5 - MODERATE PAIN

## 2024-07-01 ASSESSMENT — PAIN DESCRIPTION - DESCRIPTORS: DESCRIPTORS: ACHING;SORE

## 2024-07-01 NOTE — LETTER
July 1, 2024     Bandar Palomo MD  [address]  [facility], [address] [place] [id number]    Patient: Shasha De León   YOB: 2000   Date of Visit: 7/1/2024       Dear Dr. Bandar Palomo MD:    Thank you for referring Shasha De León to me for evaluation. Below are my notes for this consultation.  If you have questions, please do not hesitate to call me. I look forward to following your patient along with you.       Sincerely,     Eldon Dewitt MD      CC: Dru Hernandez MD  ______________________________________________________________________________________    [facility]  Hand and Upper Extremity Service  Initial evaluation / Consultation         Consult requested by Referring Physician: Dr. Hernandez    Chief Complaint: Bilateral hand numbness       23 y.o female presenting for bilateral hand numbness.  Initially began in 2021 during 1st pregnancy and has been persistent since then with fluctuations in severity.  She denies any sleep disturbances.  She was diagnosed with carpal tunnel syndrome based on EMG testing earlier this year which just simply confirm the diagnosis that had been made on physical examination.  She has had 2 prior rounds of steroid injections with short-term relief.  Those injections were provided by her referring physician.    Past medical history significant for POTS, EDS and prediabetes           Please refer to New Patient Intake Form scanned into patient's electronic record for self reported past medical history, past surgical history, medications, allergies, family history, social history and 10 point review of systems    Examination:  Constitutional: Oriented to person, place, and time.  Appears well-developed and well-nourished.  Head: Normocephalic and atraumatic.  Eyes: Pupils are equal, round, and reactive to light.  Cardiovascular: Intact distal pulses.  Pulmonary/Chest/Breast: Effort normal. No  respiratory distress.  Neurological: Alert and oriented to person, place, and time.  Skin: Skin is warm and dry.  Psychiatric: normal mood and affect.  Behavior is normal.  Musculoskeletal: Bilateral hand examination reveals normal appearance.  No significant swelling skin change or deformity.  No thenar or intrinsic muscle atrophy.  Full digital flexion without triggering.  Diminished sensation median nerve distribution.  Positive Xochitl median nerve compression test bilaterally.           Personal Interpretation of Diagnostic studies: EMG study dated February 15, 2024 reveals evidence of bilateral moderately severe carpal tunnel syndrome without denervation       Impression: Bilateral carpal tunnel syndrome      Plan: Diagnosis is fairly clear.  She has had injections with only short-term relief.  She is referred here for surgical consultation.  We have discussed surgery in detail including anticipated return to activities.  She does have 2 young children but I think that she should be able to return to most of her childcare responsibilities and activities very quickly after surgery.  Patient is indicated that Dr. Hernandez suggested a biopsy of some sort during procedure.  I will reach out to him for more specifics of what he is looking for.  I think that she is at low risk for the diagnosis of amyloidosis but we can certainly obtain synovial biopsy of the flexor sheath if necessary.  She will contact my office when she is ready to proceed with carpal tunnel decompression.  She needs indicated with side she wants to do first and whether or not she wants to do this under local or IV sedation.      In Office Procedures Performed: None      Medications Prescribed: None       Follow up: She will call to schedule surgery    For Surgical Planning:  Diagnosis: Bilateral carpal tunnel syndrome  Procedure: Carpal tunnel release (patient indicate which side)  CPT: 62908  Anesthesia: MAC versus local, patient to  decide  Duration: 25 minutes  Special Equipment Needed: None  Medical Notes / PM / DM / PAT needed?:  N/A  Location: [facility] or [place]  Initial Post Operative Visit: 2 weeks           Eldon Dewitt MD  [facility]  Department of Orthopaedic Surgery  Hand and Upper Extremity Reconstruction      Scribe Attestation  By signing my name below, I Keyana Willian , Scribbrenda   attest that this documentation has been prepared under the direction and in the presence of Dr. Eldon Dewitt.      Dictation performed with the use of voice recognition software.  Syntax and grammatical errors may exist.

## 2024-07-25 ENCOUNTER — HOSPITAL ENCOUNTER (OUTPATIENT)
Dept: RADIOLOGY | Facility: CLINIC | Age: 24
Discharge: HOME | End: 2024-07-25
Payer: COMMERCIAL

## 2024-07-25 DIAGNOSIS — R11.0 NAUSEA: ICD-10-CM

## 2024-07-25 DIAGNOSIS — R10.84 GENERALIZED ABDOMINAL PAIN: ICD-10-CM

## 2024-07-25 DIAGNOSIS — R10.819 ABDOMINAL TENDERNESS, UNSPECIFIED SITE: ICD-10-CM

## 2024-07-25 PROCEDURE — 2550000001 HC RX 255 CONTRASTS

## 2024-07-25 PROCEDURE — 74177 CT ABD & PELVIS W/CONTRAST: CPT

## 2024-07-25 PROCEDURE — 74177 CT ABD & PELVIS W/CONTRAST: CPT | Performed by: RADIOLOGY

## 2024-08-19 ENCOUNTER — LAB (OUTPATIENT)
Dept: LAB | Facility: LAB | Age: 24
End: 2024-08-19
Payer: COMMERCIAL

## 2024-08-19 DIAGNOSIS — E55.9 VITAMIN D DEFICIENCY: ICD-10-CM

## 2024-08-19 DIAGNOSIS — J45.20 MILD INTERMITTENT ASTHMA WITHOUT COMPLICATION (HHS-HCC): ICD-10-CM

## 2024-08-19 PROCEDURE — 82306 VITAMIN D 25 HYDROXY: CPT

## 2024-08-19 PROCEDURE — 80053 COMPREHEN METABOLIC PANEL: CPT

## 2024-08-19 PROCEDURE — 36415 COLL VENOUS BLD VENIPUNCTURE: CPT

## 2024-08-20 ENCOUNTER — APPOINTMENT (OUTPATIENT)
Dept: PRIMARY CARE | Facility: CLINIC | Age: 24
End: 2024-08-20
Payer: COMMERCIAL

## 2024-08-20 VITALS
HEART RATE: 73 BPM | TEMPERATURE: 97.1 F | OXYGEN SATURATION: 98 % | SYSTOLIC BLOOD PRESSURE: 129 MMHG | RESPIRATION RATE: 18 BRPM | HEIGHT: 68 IN | BODY MASS INDEX: 22.43 KG/M2 | DIASTOLIC BLOOD PRESSURE: 86 MMHG | WEIGHT: 148 LBS

## 2024-08-20 DIAGNOSIS — Z00.00 ANNUAL PHYSICAL EXAM: Primary | ICD-10-CM

## 2024-08-20 DIAGNOSIS — K58.1 IRRITABLE BOWEL SYNDROME WITH CONSTIPATION: ICD-10-CM

## 2024-08-20 DIAGNOSIS — J45.20 MILD INTERMITTENT ASTHMA WITHOUT COMPLICATION (HHS-HCC): ICD-10-CM

## 2024-08-20 DIAGNOSIS — E55.9 VITAMIN D DEFICIENCY: ICD-10-CM

## 2024-08-20 DIAGNOSIS — Z13.220 SCREENING FOR HYPERLIPIDEMIA: ICD-10-CM

## 2024-08-20 DIAGNOSIS — M76.62 ACHILLES TENDINITIS OF BOTH LOWER EXTREMITIES: ICD-10-CM

## 2024-08-20 DIAGNOSIS — G90.A POTS (POSTURAL ORTHOSTATIC TACHYCARDIA SYNDROME): ICD-10-CM

## 2024-08-20 DIAGNOSIS — Z13.29 THYROID DISORDER SCREEN: ICD-10-CM

## 2024-08-20 DIAGNOSIS — M76.61 ACHILLES TENDINITIS OF BOTH LOWER EXTREMITIES: ICD-10-CM

## 2024-08-20 PROBLEM — I47.11 INAPPROPRIATE SINUS TACHYCARDIA (CMS-HCC): Status: RESOLVED | Noted: 2023-09-28 | Resolved: 2024-08-20

## 2024-08-20 LAB
25(OH)D3 SERPL-MCNC: 42 NG/ML (ref 30–100)
ALBUMIN SERPL BCP-MCNC: 4.8 G/DL (ref 3.4–5)
ALP SERPL-CCNC: 51 U/L (ref 33–110)
ALT SERPL W P-5'-P-CCNC: 10 U/L (ref 7–45)
ANION GAP SERPL CALC-SCNC: 16 MMOL/L
AST SERPL W P-5'-P-CCNC: 16 U/L (ref 9–39)
BILIRUB SERPL-MCNC: 0.5 MG/DL (ref 0–1.2)
BUN SERPL-MCNC: 16 MG/DL (ref 6–23)
CALCIUM SERPL-MCNC: 10 MG/DL (ref 8.6–10.6)
CHLORIDE SERPL-SCNC: 102 MMOL/L (ref 98–107)
CO2 SERPL-SCNC: 25 MMOL/L (ref 21–32)
CREAT SERPL-MCNC: 0.84 MG/DL (ref 0.5–1.05)
EGFRCR SERPLBLD CKD-EPI 2021: >90 ML/MIN/1.73M*2
GLUCOSE SERPL-MCNC: 74 MG/DL (ref 74–99)
POTASSIUM SERPL-SCNC: 4.1 MMOL/L (ref 3.5–5.3)
PROT SERPL-MCNC: 7.5 G/DL (ref 6.4–8.2)
SODIUM SERPL-SCNC: 139 MMOL/L (ref 136–145)

## 2024-08-20 PROCEDURE — 3008F BODY MASS INDEX DOCD: CPT | Performed by: FAMILY MEDICINE

## 2024-08-20 PROCEDURE — 1036F TOBACCO NON-USER: CPT | Performed by: FAMILY MEDICINE

## 2024-08-20 PROCEDURE — 99213 OFFICE O/P EST LOW 20 MIN: CPT | Performed by: FAMILY MEDICINE

## 2024-08-20 PROCEDURE — 99395 PREV VISIT EST AGE 18-39: CPT | Performed by: FAMILY MEDICINE

## 2024-08-20 RX ORDER — ALBUTEROL SULFATE 90 UG/1
2 INHALANT RESPIRATORY (INHALATION) EVERY 6 HOURS PRN
Qty: 18 G | Refills: 3 | Status: SHIPPED | OUTPATIENT
Start: 2024-08-20 | End: 2025-08-20

## 2024-08-20 ASSESSMENT — ENCOUNTER SYMPTOMS
SHORTNESS OF BREATH: 0
CHEST TIGHTNESS: 0
FEVER: 0
CONFUSION: 0
PALPITATIONS: 0
ARTHRALGIAS: 1
ABDOMINAL PAIN: 0
CHILLS: 0

## 2024-08-20 NOTE — ASSESSMENT & PLAN NOTE
Recent labs reviewed    Immunization recommendations reviewed  Continue to follow with her various specialists    Maintain healthy diet and lifestyle habits

## 2024-08-20 NOTE — PROGRESS NOTES
"Subjective   Patient ID: Shasha De León is a 23 y.o. female who presents for Annual Exam (6 month follow up ).    HPI patient today for follow-up of ongoing healthcare issues SILS a variety of complaints none of which is ever been completely resolved.  She has multiple joint complaints but rheumatologic workup was unremarkable rheumatology consult did not really find much to offer her.  She says that her Achilles tendons hurt at times but she is not willing to take anti-inflammatory medicine or see specialist.  She has complaints of abdominal discomfort but workup including various imaging testing has been unremarkable for acute etiology she has follow-up with a later this week with a GI specialist.  She feels her POTS has been better now that the weather is getting cooler compared to earlier this summer.    Review of Systems   Constitutional:  Negative for chills and fever.   HENT:  Negative for congestion and ear pain.    Eyes:  Negative for visual disturbance.   Respiratory:  Negative for chest tightness and shortness of breath.    Cardiovascular:  Negative for chest pain and palpitations.   Gastrointestinal:  Negative for abdominal pain.   Musculoskeletal:  Positive for arthralgias.   Skin:  Negative for pallor.   Psychiatric/Behavioral:  Negative for confusion.        Objective   /86   Pulse 73   Temp 36.2 °C (97.1 °F) (Temporal)   Resp 18   Ht 1.727 m (5' 8\")   Wt 67.1 kg (148 lb)   SpO2 98%   BMI 22.50 kg/m²     Physical Exam  Vitals and nursing note reviewed.   Constitutional:       General: She is not in acute distress.     Appearance: Normal appearance. She is not ill-appearing.   HENT:      Head: Normocephalic and atraumatic.      Right Ear: Tympanic membrane, ear canal and external ear normal.      Left Ear: Tympanic membrane, ear canal and external ear normal.      Mouth/Throat:      Pharynx: Oropharynx is clear.   Eyes:      Extraocular Movements: Extraocular movements intact. "   Cardiovascular:      Rate and Rhythm: Normal rate and regular rhythm.      Pulses: Normal pulses.      Heart sounds: Normal heart sounds.   Pulmonary:      Effort: Pulmonary effort is normal.      Breath sounds: Normal breath sounds.   Abdominal:      General: Abdomen is flat. Bowel sounds are normal.      Palpations: Abdomen is soft.      Tenderness: There is no abdominal tenderness.   Musculoskeletal:         General: Normal range of motion.      Cervical back: Neck supple.   Skin:     General: Skin is warm.   Neurological:      Mental Status: She is alert and oriented to person, place, and time. Mental status is at baseline.   Psychiatric:         Mood and Affect: Mood normal.       Recent Results (from the past 1008 hour(s))   Comprehensive Metabolic Panel    Collection Time: 08/19/24  3:38 PM   Result Value Ref Range    Glucose 74 74 - 99 mg/dL    Sodium 139 136 - 145 mmol/L    Potassium 4.1 3.5 - 5.3 mmol/L    Chloride 102 98 - 107 mmol/L    Bicarbonate 25 21 - 32 mmol/L    Anion Gap 16 mmol/L    Urea Nitrogen 16 6 - 23 mg/dL    Creatinine 0.84 0.50 - 1.05 mg/dL    eGFR >90 >60 mL/min/1.73m*2    Calcium 10.0 8.6 - 10.6 mg/dL    Albumin 4.8 3.4 - 5.0 g/dL    Alkaline Phosphatase 51 33 - 110 U/L    Total Protein 7.5 6.4 - 8.2 g/dL    AST 16 9 - 39 U/L    Bilirubin, Total 0.5 0.0 - 1.2 mg/dL    ALT 10 7 - 45 U/L   Vitamin D 25-Hydroxy,Total (for eval of Vitamin D levels)    Collection Time: 08/19/24  3:38 PM   Result Value Ref Range    Vitamin D, 25-Hydroxy, Total 42 30 - 100 ng/mL     Recent labs reviewed overall numbers are stable with nice improvement in vitamin D but she admits she never took vitamin D supplementation.  Continue to monitor.    She may want to consider low-dose vitamin D such as 2000 units of D3 starting this winter given previous results.    Follow through with GI specialist along with her other specialist that she has been seeing    Call if there is any other acute changes or issues where  she changes her mind with regards to specialty referral regarding her Achilles tendons    Return to office 6 months with fasting labs    Assessment/Plan   Problem List Items Addressed This Visit             ICD-10-CM    Asthma ([facility]) J45.909     Stable renew albuterol inhaler which she rarely needs         Relevant Medications    albuterol 90 mcg/actuation inhaler    Other Relevant Orders    Follow Up In Primary Care - Established    CBC    Comprehensive Metabolic Panel    Vitamin D deficiency E55.9     Continue to monitor supplement as needed         Relevant Orders    Vitamin D 25-Hydroxy,Total (for eval of Vitamin D levels)    POTS (postural orthostatic tachycardia syndrome) G90.A     Clinically stable continue to follow with specialist         Relevant Orders    Follow Up In Primary Care - Established    CBC    Comprehensive Metabolic Panel    Irritable bowel syndrome with constipation K58.1     Recent CT scan essentially unremarkable.  GI workup for that matter has been essentially unremarkable including previous endoscopy and ultrasound.  She has an appointment to see abdominal specialist for follow-up later this week.         Thyroid disorder screen Z13.29     TSH with reflex         Relevant Orders    TSH with reflex to Free T4 if abnormal    Screening for hyperlipidemia Z13.220     Check fasting lipid         Relevant Orders    Lipid Panel    Achilles tendinitis of both lower extremities M76.61, M76.62     Offered referral to podiatry or orthopedic foot and ankle specialist she declined.  We discussed possibility of orthotics and heel cups that she is declined  We discussed anti-inflammatory medicine she would rather avoid.           Relevant Orders    Follow Up In Primary Care - Established    Annual physical exam - Primary Z00.00     Recent labs reviewed    Immunization recommendations reviewed  Continue to follow with her various specialists    Maintain healthy diet and lifestyle habits

## 2024-08-20 NOTE — ASSESSMENT & PLAN NOTE
Recent CT scan essentially unremarkable.  GI workup for that matter has been essentially unremarkable including previous endoscopy and ultrasound.  She has an appointment to see abdominal specialist for follow-up later this week.

## 2024-08-20 NOTE — ASSESSMENT & PLAN NOTE
Offered referral to podiatry or orthopedic foot and ankle specialist she declined.  We discussed possibility of orthotics and heel cups that she is declined  We discussed anti-inflammatory medicine she would rather avoid.

## 2024-09-18 DIAGNOSIS — G56.03 CARPAL TUNNEL SYNDROME, BILATERAL: ICD-10-CM

## 2024-10-03 RX ORDER — ONDANSETRON HYDROCHLORIDE 2 MG/ML
4 INJECTION, SOLUTION INTRAVENOUS ONCE AS NEEDED
OUTPATIENT
Start: 2024-10-03

## 2024-10-03 RX ORDER — SODIUM CHLORIDE, SODIUM LACTATE, POTASSIUM CHLORIDE, CALCIUM CHLORIDE 600; 310; 30; 20 MG/100ML; MG/100ML; MG/100ML; MG/100ML
100 INJECTION, SOLUTION INTRAVENOUS CONTINUOUS
OUTPATIENT
Start: 2024-10-03

## 2024-10-03 RX ORDER — DROPERIDOL 2.5 MG/ML
0.62 INJECTION, SOLUTION INTRAMUSCULAR; INTRAVENOUS ONCE AS NEEDED
OUTPATIENT
Start: 2024-10-03

## 2024-10-03 RX ORDER — ALBUTEROL SULFATE 0.83 MG/ML
2.5 SOLUTION RESPIRATORY (INHALATION) ONCE AS NEEDED
OUTPATIENT
Start: 2024-10-03

## 2024-10-03 RX ORDER — LABETALOL HYDROCHLORIDE 5 MG/ML
5 INJECTION, SOLUTION INTRAVENOUS ONCE AS NEEDED
OUTPATIENT
Start: 2024-10-03

## 2024-10-03 RX ORDER — FENTANYL CITRATE 50 UG/ML
50 INJECTION, SOLUTION INTRAMUSCULAR; INTRAVENOUS EVERY 5 MIN PRN
OUTPATIENT
Start: 2024-10-03

## 2024-10-03 RX ORDER — LIDOCAINE IN NACL,ISO-OSMOT/PF 30 MG/3 ML
0.1 SYRINGE (ML) INJECTION ONCE
OUTPATIENT
Start: 2024-10-03 | End: 2024-10-03

## 2024-10-03 RX ORDER — ACETAMINOPHEN 325 MG/1
650 TABLET ORAL EVERY 4 HOURS PRN
OUTPATIENT
Start: 2024-10-03

## 2024-10-03 RX ORDER — OXYCODONE HYDROCHLORIDE 5 MG/1
5 TABLET ORAL EVERY 4 HOURS PRN
OUTPATIENT
Start: 2024-10-03

## 2024-10-03 RX ORDER — FENTANYL CITRATE 50 UG/ML
25 INJECTION, SOLUTION INTRAMUSCULAR; INTRAVENOUS EVERY 5 MIN PRN
OUTPATIENT
Start: 2024-10-03

## 2024-10-04 ENCOUNTER — HOSPITAL ENCOUNTER (OUTPATIENT)
Facility: CLINIC | Age: 24
Setting detail: OUTPATIENT SURGERY
Discharge: HOME | End: 2024-10-04
Attending: ORTHOPAEDIC SURGERY | Admitting: ORTHOPAEDIC SURGERY
Payer: COMMERCIAL

## 2024-10-04 VITALS
RESPIRATION RATE: 16 BRPM | BODY MASS INDEX: 22.72 KG/M2 | OXYGEN SATURATION: 100 % | HEIGHT: 68 IN | DIASTOLIC BLOOD PRESSURE: 72 MMHG | TEMPERATURE: 98.1 F | HEART RATE: 79 BPM | WEIGHT: 149.91 LBS | SYSTOLIC BLOOD PRESSURE: 112 MMHG

## 2024-10-04 DIAGNOSIS — G56.03 CARPAL TUNNEL SYNDROME, BILATERAL: Primary | ICD-10-CM

## 2024-10-04 LAB — PREGNANCY TEST URINE, POC: NEGATIVE

## 2024-10-04 PROCEDURE — 2500000005 HC RX 250 GENERAL PHARMACY W/O HCPCS: Performed by: ORTHOPAEDIC SURGERY

## 2024-10-04 PROCEDURE — 26145 TENDON EXCISION PALM/FINGER: CPT | Performed by: ORTHOPAEDIC SURGERY

## 2024-10-04 PROCEDURE — 3600000008 HC OR TIME - EACH INCREMENTAL 1 MINUTE - PROCEDURE LEVEL THREE: Performed by: ORTHOPAEDIC SURGERY

## 2024-10-04 PROCEDURE — 7100000009 HC PHASE TWO TIME - INITIAL BASE CHARGE: Performed by: ORTHOPAEDIC SURGERY

## 2024-10-04 PROCEDURE — 2500000004 HC RX 250 GENERAL PHARMACY W/ HCPCS (ALT 636 FOR OP/ED): Performed by: ORTHOPAEDIC SURGERY

## 2024-10-04 PROCEDURE — 3600000003 HC OR TIME - INITIAL BASE CHARGE - PROCEDURE LEVEL THREE: Performed by: ORTHOPAEDIC SURGERY

## 2024-10-04 PROCEDURE — 7100000010 HC PHASE TWO TIME - EACH INCREMENTAL 1 MINUTE: Performed by: ORTHOPAEDIC SURGERY

## 2024-10-04 PROCEDURE — 64721 CARPAL TUNNEL SURGERY: CPT | Performed by: ORTHOPAEDIC SURGERY

## 2024-10-04 RX ORDER — SODIUM CHLORIDE 0.9 G/100ML
IRRIGANT IRRIGATION AS NEEDED
Status: DISCONTINUED | OUTPATIENT
Start: 2024-10-04 | End: 2024-10-04 | Stop reason: HOSPADM

## 2024-10-04 RX ORDER — HYDROCODONE BITARTRATE AND ACETAMINOPHEN 5; 325 MG/1; MG/1
1 TABLET ORAL EVERY 6 HOURS PRN
Qty: 12 TABLET | Refills: 0 | Status: SHIPPED | OUTPATIENT
Start: 2024-10-04 | End: 2024-10-07

## 2024-10-04 RX ORDER — BUPIVACAINE HYDROCHLORIDE 5 MG/ML
INJECTION, SOLUTION EPIDURAL; INTRACAUDAL AS NEEDED
Status: DISCONTINUED | OUTPATIENT
Start: 2024-10-04 | End: 2024-10-04 | Stop reason: HOSPADM

## 2024-10-04 RX ORDER — LIDOCAINE HYDROCHLORIDE 10 MG/ML
INJECTION, SOLUTION INFILTRATION; PERINEURAL AS NEEDED
Status: DISCONTINUED | OUTPATIENT
Start: 2024-10-04 | End: 2024-10-04 | Stop reason: HOSPADM

## 2024-10-04 ASSESSMENT — COLUMBIA-SUICIDE SEVERITY RATING SCALE - C-SSRS
6. HAVE YOU EVER DONE ANYTHING, STARTED TO DO ANYTHING, OR PREPARED TO DO ANYTHING TO END YOUR LIFE?: NO
2. HAVE YOU ACTUALLY HAD ANY THOUGHTS OF KILLING YOURSELF?: NO
1. IN THE PAST MONTH, HAVE YOU WISHED YOU WERE DEAD OR WISHED YOU COULD GO TO SLEEP AND NOT WAKE UP?: NO

## 2024-10-04 ASSESSMENT — PAIN SCALES - GENERAL
PAINLEVEL_OUTOF10: 0 - NO PAIN

## 2024-10-04 ASSESSMENT — PAIN - FUNCTIONAL ASSESSMENT
PAIN_FUNCTIONAL_ASSESSMENT: 0-10

## 2024-10-04 NOTE — DISCHARGE INSTRUCTIONS
Post-Operative Instructions  Dr. Eldon Dewitt [phone]    Dressing:  You have a bandage or splint covering your operative site.    You may remove the dressing in 4  days following surgery. Once your dressing is removed you may shower and/or wash your incision in a sink with soap and water. Do not soak your incision. No bath tubs, hot tubs or swimming pools. After washing the wound please pat the incision dry thoroughly. Please use mild soap. Please do not use hydrogen peroxide. Please cover the wound with a band-aid or gauze and change it daily. Please do not apply any salves, creams, lotions or ointments to the surgical incision. Otherwise keep incision clean and dry (no yard work, engine work, etc.)    Post Anesthesia Instructions:  If you received general anesthesia or IV sedation for your procedure for the next 24 hours: No driving, no drinking alcohol, no sleeping aids, no important decision making, and have an adult with you for 24 hours.    Showering:  You may shower following surgery but please adhere to above instructions regarding the dressing. If showering with bandage/splint in place please ensure that it is kept dry and covered while bathing. There are commercially available cast bags that can be used to protect your dressing while showering. If using garbage bags please make sure that there are no holes in the bag and that the bag has been sealed above the dressing. If the bandage gets wet you must contact your surgeon's office to make arrangements to be seen to have the bandage changed.     Ice/Elevation:  The application of ice to your surgical site after surgery will help with pain control and swelling. This can be very effective for 48-72 hours after surgery. Please be careful to avoid getting bandage wet from a leaky ice bag. Please be advised that the ice may need to be applied for longer periods of time for the cooling effect to penetrate the post-operative dressing.     Elevation of the  operative site above the level of the heart as much as possible for the first 48-72 hours after surgery. Use your sling if you have been provided one while standing or walking. If your fingers are not included in the dressing you are encouraged to attempt finger range of motion as this will help with your hand swelling and ultimate recovery.    Pain Medication:  If you received a regional anesthetic on the day of your surgery your arm and hand may be numb for up to 24 hours after surgery. It is important to wear your sling while the block is still effective in order to protect your arm. It is advisable to take pain medications prior to going to sleep in case the regional anesthesia medication wears off while you are sleeping.    Take your pain medications as directed. Narcotic pain medications can cause lethargy, nausea and constipation. Please contact your surgeon's office and discontinue the medication if these symptoms become problematic. Eating a regular diet, drinking fluids and walking can help with constipation from these medications. Avoid alcohol consumption and driving while taking narcotic pain medications.     Additional pain control options:     You are encouraged to take over the counter medications like Advil / Motrin / Ibuprofen / Aleve in addition to your prescribed pain medications after surgery.    Smoking/Tobacco:  Tobacco use is known to interfere with wound and fracture healing and increase post-operative pain. It can also increase your risk of poor outcomes following surgery. Please do not use tobacco or nicotine products following your surgery. This includes smokeless tobacco, or nicotine replacement products (patches, gum, etc.).    Driving:  It is not advisable to operate a vehicle while using narcotic pain medications. Additionally, please be advised that you are likely to have challenges operating a car or motorcycle while you are still in your postoperative dressing and this could  increase your risk of being involved in an accident and being cited for driving while physically impaired.     Warning Signs:  Observe your arm/hand and incision site (if visible) for increased redness, inflammation, drainage, odor or pain that is unrelieved by rest, elevation or medication. Please contact your surgeon's office immediately if you develop any of these issues or if you develop a fever greater than 101°.    Follow Up Appointments:  Your post-operative appointment has been scheduled for  10/21/2024 at 2:15 pm    [facility], [address] [place] [address] Rd., [place], [place], [address]

## 2024-10-04 NOTE — H&P
"History Of Present Illness  Shasha De León is a 24 y.o. female presenting with right carpal tunnel syndrome.     Past Medical History  Past Medical History:   Diagnosis Date    Asthma        Surgical History  Past Surgical History:   Procedure Laterality Date    OTHER SURGICAL HISTORY  07/07/2022    Hernia repair        Social History  She reports that she has never smoked. She has never been exposed to tobacco smoke. She has never used smokeless tobacco. She reports current alcohol use. She reports that she does not use drugs.    Family History  No family history on file.     Allergies  Patient has no known allergies.    Review of Systems     Physical Exam     Last Recorded Vitals  Blood pressure 123/69, pulse 89, temperature 36.6 °C (97.9 °F), temperature source Temporal, resp. rate 16, height 1.727 m (5' 8\"), weight 68 kg (149 lb 14.6 oz), SpO2 99%.    Relevant Results             Assessment/Plan   Assessment & Plan  Carpal tunnel syndrome, bilateral      Will proceed with right carpal tunnel release and tenosynovial biopsy as scheduled       Eldon Dewitt MD    "

## 2024-10-04 NOTE — OP NOTE
Right Carpal tunnel release , tenosynovial biopsy / 25 Minutes (R) Operative Note     Date: 10/4/2024  OR Location: [facility] OR    Name: Shasha De León, : 2000, Age: 24 y.o., MRN: [id number], Sex: female    Diagnosis  Pre-op Diagnosis      * Carpal tunnel syndrome, bilateral [G56.03] Post-op Diagnosis     * Carpal tunnel syndrome, bilateral [G56.03]     Procedures  Right Carpal tunnel release , tenosynovial biopsy / 25 Minutes  34902 - CT NEUROPLASTY &/TRANSPOS MEDIAN NRV CARPAL TUNNE    CT SYNVCT TDN SHTH RAD FLXR TDN PALM&/FNGR EA TDN [[id number]]  Surgeons      * Eldon Dewitt - Primary    Resident/Fellow/Other Assistant:  Surgeons and Role:     * Jose Mendez MD - Resident - Assisting    Procedure Summary  Anesthesia: Anesthesia type not filed in the log.  ASA: ASA status not filed in the log.  Anesthesia Staff: No anesthesia staff entered.  Estimated Blood Loss: 0 mL  Intra-op Medications:   Administrations occurring from 0945 to 1030 on 10/04/24:   Medication Name Total Dose   sodium chloride 0.9 % irrigation solution 100 mL   bupivacaine PF (Marcaine) 0.5 % (5 mg/mL) injection 5 mL   lidocaine (Xylocaine) 10 mg/mL (1 %) injection 5 mL              Anesthesia Record               Intraprocedure I/O Totals       None           Specimen:   ID Type Source Tests Collected by Time   1 : A. RIGHT TENOSYNOVIUM Tissue SOFT TISSUE RESECTION SURGICAL PATHOLOGY EXAM Eldon eDwitt MD 10/4/2024 1005   2 :  Tissue  SURGICAL PATHOLOGY EXAM Eldon Dewitt MD 10/4/2024 1005        Staff:   Circulator: Michelle  Scrub Person: Hamzah         Drains and/or Catheters: * None in log *    Tourniquet Times:   * Missing tourniquet times found for documented tourniquets in lo *     Implants:     Findings: Carpal tunnel syndrome.  Mild tenosynovial hypertrophy    Indications: Shasha De León is an 24 y.o. female who is having surgery for Carpal tunnel syndrome, bilateral [G56.03].      The patient was seen in  the preoperative area. The risks, benefits, complications, treatment options, non-operative alternatives, expected recovery and outcomes were discussed with the patient. The possibilities of reaction to medication, pulmonary aspiration, injury to surrounding structures, bleeding, recurrent infection, the need for additional procedures, failure to diagnose a condition, and creating a complication requiring transfusion or operation were discussed with the patient. The patient concurred with the proposed plan, giving informed consent.  The site of surgery was properly noted/marked if necessary per policy. The patient has been actively warmed in preoperative area. Preoperative antibiotics are not indicated. Venous thrombosis prophylaxis are not indicated.    Procedure Details:   24-year-old female with symptomatic bilateral carpal tunnel syndrome presents today for right carpal tunnel release.  At the request of her neurologist we are also planning to perform a tenosynovial biopsy to rule out the diagnosis of amyloidosis.  Preoperatively the right hand was identified and marked for surgery.  Informed consent process was completed.    Patient was brought to the operating and placed supine on the operating table.  A timeout procedure was performed to verify correct patient procedure and operative site.  Local anesthetic infiltrated around the base of the right palm.  Right upper extremity prepped and draped in usual sterile fashion.  Limb was exsanguinated and a tourniquet was inflated to 250 mmHg.    We made a longitudinal incision the central aspect of the proximal palm.  Sharp dissection carried through the superficial palmar fascia.  Deep retractors were placed.  Transverse carpal ligament was exposed and then divided longitudinally along its ulnar margin.  Complete median nerve decompression was confirmed.  We identified median nerve hyperemia following release of the transverse carpal ligament.  We then carefully  mobilized the median nerve in a radial direction to reveal the underlying flexor tendon bursa.  We excised a segment of the tenosynovial tissue encasing the flexor tendons.  This was placed into formalin and sent to pathology for staining with Congo red to identify potential amyloidosis.  The wound was copiously irrigated and then closed in interrupted fashion.  A sterile bandage was applied the tourniquet was deflated.  The patient was transferred to the recovery in stable condition.    Postoperatively she will be discharged home once comfortable.  She can remove her bandage on postop day #4 begin wound care with gentle activities as instructed.  Return to clinic in 2 weeks for wound check, pathology review and advancement of activities.        Complications:  None; patient tolerated the procedure well.    Disposition: PACU - hemodynamically stable.  Condition: stable         Additional Details:      Attending Attestation: I was present and scrubbed for the entire procedure.    Eldon Dewitt  Phone Number: [phone]

## 2024-10-07 ASSESSMENT — PAIN SCALES - GENERAL: PAINLEVEL_OUTOF10: 0 - NO PAIN

## 2024-10-14 LAB
LABORATORY COMMENT REPORT: NORMAL
PATH REPORT.FINAL DX SPEC: NORMAL
PATH REPORT.GROSS SPEC: NORMAL
PATH REPORT.RELEVANT HX SPEC: NORMAL
PATH REPORT.TOTAL CANCER: NORMAL

## 2024-10-16 NOTE — PROGRESS NOTES
[facility]  Hand and Upper Extremity Service  Post Operative visit         Date of surgery: 10/4/24    Surgery(s) performed: Right carpal tunnel release       Subjective report: First postoperative visit. She returns for her bilateral carpal tunnel syndrome but the ultrasound didn't evaluate her cubital tunnel symptoms. She reports her right hand medial nerve symptoms have significantly improved but she's having numbness in her ring and small finger. She's having progressive symptoms of all fingers in her left arm and intermittent lower extremity episodes where she has severe burning, tightness, and numbness and those symptoms haven't been previous evaluated.        Exam findings: Right hand reveals well healed surgical incision proximal palm. Full finger and thumb flexion without triggering. Diminished sensation ring and small finger. Positive Tinel's in right cubital tunnel. Left hand reveals subjective numbness in all fingers.      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 EMG testing didn't reveal any ulnar neuropathy issues but I think a neuromuscular ultrasound would be more appropriate to evaluate this. Unfortunately, prior ultrasound didn't evaluate cubital tunnels. She'll obtain a neuromuscular ultrasound bilaterally to examine cubital tunnels. She's also having lower extremity symptoms so I'll communicate with her neurologist to see if there are other tests that should be done for the neurologic symptoms she's having in her feet and legs. She'll follow up with me after ultrasound completion.        Follow Up: After ultrasound completion             Eldon Dewitt MD    [facility] [employer]  Department of Orthopaedic Surgery  Chief of Hand and Upper Extremity Surgery  [facility]    Candy  Attestation  By signing my name below, I, Candy Valentin   attest that this documentation has been prepared under the direction and in the presence of Dr. Eldon Dewitt.      Dictation performed with the use of voice recognition software. Syntax and grammatical errors may exist.

## 2024-10-21 ENCOUNTER — APPOINTMENT (OUTPATIENT)
Dept: ORTHOPEDIC SURGERY | Facility: CLINIC | Age: 24
End: 2024-10-21
Payer: COMMERCIAL

## 2024-10-21 VITALS — BODY MASS INDEX: 22.58 KG/M2 | HEIGHT: 68 IN | WEIGHT: 149 LBS

## 2024-10-21 DIAGNOSIS — G56.23 ULNAR NEUROPATHY OF BOTH UPPER EXTREMITIES: Primary | ICD-10-CM

## 2024-10-21 PROCEDURE — 99024 POSTOP FOLLOW-UP VISIT: CPT | Performed by: ORTHOPAEDIC SURGERY

## 2024-10-21 PROCEDURE — 1036F TOBACCO NON-USER: CPT | Performed by: ORTHOPAEDIC SURGERY

## 2024-10-21 PROCEDURE — 3008F BODY MASS INDEX DOCD: CPT | Performed by: ORTHOPAEDIC SURGERY

## 2024-10-21 ASSESSMENT — PAIN SCALES - GENERAL: PAINLEVEL_OUTOF10: 3

## 2024-10-21 ASSESSMENT — PAIN - FUNCTIONAL ASSESSMENT: PAIN_FUNCTIONAL_ASSESSMENT: 0-10

## 2024-10-21 ASSESSMENT — PAIN DESCRIPTION - DESCRIPTORS: DESCRIPTORS: SORE

## 2024-10-21 NOTE — LETTER
October 21, 2024     Niall Garcia MD  [address]  [address]  [place] [address]    Patient: Shasha De León   YOB: 2000   Date of Visit: 10/21/2024       Dear Dr. Niall Garcia MD:    Thank you for referring Shasha De León to me for evaluation. Below are my notes for this consultation.  If you have questions, please do not hesitate to call me. I look forward to following your patient along with you.       Sincerely,     Eldon Dewitt MD      CC: Dru Hernandez MD  ______________________________________________________________________________________    [facility]  Hand and Upper Extremity Service  Post Operative visit         Date of surgery: 10/4/24    Surgery(s) performed: Right carpal tunnel release       Subjective report: First postoperative visit. She returns for her bilateral carpal tunnel syndrome but the ultrasound didn't evaluate her cubital tunnel symptoms. She reports her right hand medial nerve symptoms have significantly improved but she's having numbness in her ring and small finger. She's having progressive symptoms of all fingers in her left arm and intermittent lower extremity episodes where she has severe burning, tightness, and numbness and those symptoms haven't been previous evaluated.        Exam findings: Right hand reveals well healed surgical incision proximal palm. Full finger and thumb flexion without triggering. Diminished sensation ring and small finger. Positive Tinel's in right cubital tunnel. Left hand reveals subjective numbness in all fingers.      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 EMG testing didn't reveal any ulnar neuropathy issues but I think a neuromuscular ultrasound would be more appropriate to evaluate this. Unfortunately, prior ultrasound didn't evaluate cubital tunnels. She'll obtain a neuromuscular  ultrasound bilaterally to examine cubital tunnels. She's also having lower extremity symptoms so I'll communicate with her neurologist to see if there are other tests that should be done for the neurologic symptoms she's having in her feet and legs. She'll follow up with me after ultrasound completion.        Follow Up: After ultrasound completion             Eldon Dewitt MD    [facility] [employer]  Department of Orthopaedic Surgery  Chief of Hand and Upper Extremity Surgery  [facility]    Scribe Attestation  By signing my name below, I, Candy Valentin   attest that this documentation has been prepared under the direction and in the presence of Dr. Eldon Dewitt.      Dictation performed with the use of voice recognition software. Syntax and grammatical errors may exist.

## 2024-10-22 DIAGNOSIS — R29.898 LEG WEAKNESS, BILATERAL: ICD-10-CM

## 2024-11-12 ENCOUNTER — PROCEDURE VISIT (OUTPATIENT)
Dept: NEUROLOGY | Facility: HOSPITAL | Age: 24
End: 2024-11-12
Payer: COMMERCIAL

## 2024-11-12 DIAGNOSIS — G56.23 ULNAR NEUROPATHY OF BOTH UPPER EXTREMITIES: Primary | ICD-10-CM

## 2024-11-12 PROCEDURE — 76883 US NRV&ACC STRUX 1XTR COMPRE: CPT | Performed by: PSYCHIATRY & NEUROLOGY

## 2024-11-12 NOTE — PROGRESS NOTES
NEUROMUSCULAR ULTRASOUND OF THE RIGHT AND LEFT UPPER EXTREMITY    INDICATION:  Clinical Information: History of bilateral carpal tunnel syndrome with onset during pregnancy. Has been treated with carpal tunnel injections and in October 2024 underwent right carpal tunnel release surgery. Planning on doing left carpal tunnel release surgery in the future. Has numbness in digits 4 and 5 in bilateral hands.    Neuromuscular ultrasound to be performed:  (a) to evaluate the echotexture and size of the right and left median nerves in the limb with attention to the carpal tunnel;  (b) to assess for any identifiable structural source of median nerve compression;   (c) to assess adjacent structures around the median nerve for abnormalities.   (d) to assess the wrist joints for abnormalities  (e) to evaluate the echotexture and size of the right and ulnar nerves throughout its course in the limb;   (f) to assess for any identifiable mechanical source of ulnar nerve compression;   (g) to identify any dynamic source of neuropathy from nerve subluxation or dislocation;  (h) to assess adjacent structures around the elbows for abnormalities    HEIGHT: 5 ft 8 in  WEIGHT: 140 lbs.  HANDEDNESS: Right    COMPARISON:  Compared to neuromuscular ultrasound performed on 4/29/24    TECHNIQUE:  The bilateral median nerves were studied at the distal wrist into the carpal tunnel. The examination was performed using a [employer] P9 ultrasound machine with a 15-6 MHz matrix linear transducer. Both axial and longitudinal views were obtained. The patient was examined supine with the arm extended and supinated. Cross sectional area (CSA) was measured within the epineurium, using the trace method. At locations where repeat measurements were taken, the mean value is reported below. Transverse and longitudinal images were obtained. The right and left ulnar nerves and accompanying structures were studied throughout their entire anatomic course in the limb from  the wrist to the axilla, including real-time cine imaging. For the ulnar nerves, the patient was placed supine with the arms externally rotated, abducted, and slightly flexed at the elbow. With the elbow extended, the transducer was placed at the level of the medial epicondyle as the patient´s elbow was passively flexed to determine if either ulnar nerve subluxed or dislocated out of the groove.    FINDINGS:  At the right wrist at the distal wrist crease (proximal carpal tunnel), the median nerve CSA was 22.2 mm2 (NL < 10 mm2; borderline 10-13 mm2; ABN > 13 mm2).    The echogenicity of the right median nerve at the wrist was reduced. The mobility of the right median nerve with flexion and extension of the fingers was normal. Color Doppler of the right median nerve showed normal median nerve vascularity. No abnormal tenosynovitis of the right flexor tendons was noted.    Using a longitudinal scan of the right median nerve at the wrist, a notch sign was seen under the flexor retinaculum.     A right persistent median artery was not present. A right bifid median nerve was not present. No other abnormal mass or ganglia was appreciated in the right carpal tunnel. With extension of the fingers, there was no abnormal intrusion of the right flexor digitorum sublimis. With flexion of the fingers, there was no abnormal intrusion of the right lumbrical muscles.    In the mid-forearm, approximately 12 cm proximal to the distal wrist crease, the right median nerve was seen between the flexor digitorum sublimis and flexor digitorum profundus muscles. At the mid-forearm, the right median nerve CSA was 6.6 mm2. The ratio of the right median CSAs between the wrist and forearm (WFR) was 3.3 (NL < 1.5; borderline: 1.5 - 2.0). The echogenicity of the right median nerve in the forearm was normal.    Scanning the muscles, the echogenicity was normal of the flexor digitorum sublimis, flexor digitorum profundus, and flexor pollicis longus.  The echogenicity of the abductor pollicis brevis was normal.    Attention was then turned to the ulnar nerve. At the wrist, the ulnar CSA was 8.4 mm2 (NL < 10 mm2). The echogenicity was normal.    At the mid-forearm slightly proximal to the location where the ulnar artery  from the ulnar nerve, the CSA was 7.4 mm2 (NL < 10 mm2). The echogenicity was normal.    At the level of cubital tunnel, the ulnar nerve with the largest CSA was located in between the two heads of the flexor carpi ulnaris. The CSA at this location was 5.8 mm2 (NL < 10 mm2; mild ABN 10-15 mm2; moderate ABN 15-20 mm2; severely ABN > 20 mm2). The echogenicity was normal.    At the level of retrocondylar groove, the ulnar nerve with the largest CSA was located between the medial epicondyle and olecranon. The CSA at this location was 7.3 mm2 (NL < 10 mm2; mild ABN 10-15 mm2; moderate ABN 15-20 mm2; severely ABN > 20 mm2). The echogenicity was normal.    Color Doppler of the ulnar nerve at the elbow showed normal nerve vascularity. No abnormal mass was appreciated affecting the ulnar nerve in the elbow. An anconeus epitrochlearis muscle was not appreciated at the elbow.     At the mid-arm under the fascia of the medial triceps, the CSA was 6.3 mm2 (NL < 10 mm2). The echogenicity was normal. The ulnar nerve was the followed to the axilla and was normal with CSA of 8.1 mm2.    The ratio of the largest CSAs between the elbow and mid-forearm was 0.9 (NL < 1.5).    The ratio of the largest CSAs between the elbow and mid-arm was 1.2 (NL < 1.5).    When the patient fully flexed the elbow, the ulnar nerve did not sublux or dislocate of the groove.     At the left wrist at the distal wrist crease (proximal carpal tunnel), the median nerve CSA was 26.6 mm2 (NL < 10 mm2; borderline 10-13 mm2; ABN > 13 mm2).    The echogenicity of the left median nerve at the wrist was decreased. The mobility of the left median nerve with flexion and extension of the  fingers was reduced.     Attention was then turned to the ulnar nerve. At the wrist, the ulnar CSA was 6.7 mm2 (NL < 10 mm2). The echogenicity was normal.    At the mid-forearm slightly proximal to the location where the ulnar artery  from the ulnar nerve, the CSA was 7.6 mm2 (NL < 10 mm2). The echogenicity was normal.    At the level of cubital tunnel, the ulnar nerve with the largest CSA was located in between the two heads of the flexor carpi ulnaris. The CSA at this location was 8.6 mm2 (NL < 10 mm2; mild ABN 10-15 mm2; moderate ABN 15-20 mm2; severely ABN > 20 mm2). The echogenicity was normal.    At the level of retrocondylar groove, the ulnar nerve with the largest CSA was located between the medial epicondyle and olecranon. The CSA at this location was 7.0 mm2 (NL < 10 mm2; mild ABN 10-15 mm2; moderate ABN 15-20 mm2; severely ABN > 20 mm2). The echogenicity was normal.    Color Doppler of the ulnar nerve at the elbow showed normal nerve vascularity. No abnormal mass was appreciated affecting the ulnar nerve in the elbow. An anconeus epitrochlearis muscle was not appreciated at the elbow.     At the mid-arm under the fascia of the medial triceps, the CSA was 5.8 mm2 (NL < 10 mm2). The echogenicity was normal. The ulnar nerve was the followed to the axilla and was normal with CSA of 9.5 mm2.    The ratio of the largest CSAs between the elbow and mid-forearm was 1.1 (NL < 1.5).    The ratio of the largest CSAs between the elbow and mid-arm was 1.5 (NL < 1.5).    When the patient fully flexed the elbow, the ulnar nerve did not sublux or dislocate of the groove.     IMPRESSION:  This is an abnormal neuromuscular ultrasound examination of the right and left upper extremity.    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 Compared to prior neuromuscular ultrasound on 4/29/24, there is now no evidence of a notch sign which is  a positive findings following her interval  carpal tunnel release surgery. Please note, given recent carpal tunnel release surgery ~1 month prior, the size of the median nerve may not have had time to normalize for which clinical correlation is required.    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 The median nerve was only studied at the carpal tunnel though was similar to slightly improved compared to prior neuromuscular ultrasound on 4/29/24.    There was no ultrasound evidence of an ulnar neuropathy in either upper extremity. Both ulnar nerves were followed throughout their anatomic course in the limb from wrist to axilla.    The other visualized osseous, ligamentous, joint and tendinous structures in the either upper extremity appeared normal.     Performed by: Jamari Naylor MD  Authorized by: Jamari Naylor MD

## 2024-11-13 ENCOUNTER — TELEPHONE (OUTPATIENT)
Dept: ORTHOPEDIC SURGERY | Facility: CLINIC | Age: 24
End: 2024-11-13

## 2024-11-13 NOTE — TELEPHONE ENCOUNTER
Copied from CRM #[id number]. Topic: Information Request - Doctor, Hospital, or Provider  >> Nov 13, 2024  3:08 PM Sabrina NG wrote:  Patient called to see if appt scheduled on 12/10/24 can become virtual to go over results before another appt is completely scheduled. And possibly be a sooner available appt than December. Patient can best be reached at [phone].

## 2024-11-14 ENCOUNTER — APPOINTMENT (OUTPATIENT)
Dept: NEUROLOGY | Facility: CLINIC | Age: 24
End: 2024-11-14
Payer: COMMERCIAL

## 2024-12-02 ENCOUNTER — APPOINTMENT (OUTPATIENT)
Dept: NEUROLOGY | Facility: CLINIC | Age: 24
End: 2024-12-02
Payer: COMMERCIAL

## 2024-12-10 ENCOUNTER — APPOINTMENT (OUTPATIENT)
Dept: ORTHOPEDIC SURGERY | Facility: HOSPITAL | Age: 24
End: 2024-12-10
Payer: COMMERCIAL

## 2025-01-28 ENCOUNTER — APPOINTMENT (OUTPATIENT)
Dept: CARDIOLOGY | Facility: CLINIC | Age: 25
End: 2025-01-28
Payer: COMMERCIAL

## 2025-02-03 ENCOUNTER — APPOINTMENT (OUTPATIENT)
Dept: PRIMARY CARE | Facility: CLINIC | Age: 25
End: 2025-02-03
Payer: COMMERCIAL

## 2025-07-10 ENCOUNTER — APPOINTMENT (OUTPATIENT)
Dept: NEUROLOGY | Facility: CLINIC | Age: 25
End: 2025-07-10
Payer: COMMERCIAL

## (undated) DEVICE — Device

## (undated) DEVICE — BLADE, ARTHRO-LOK, MINI-MENISCUS, FLAT, 4 MM

## (undated) DEVICE — PADDING, WEBRIL, UNDERCAST, STERILE, 3 IN

## (undated) DEVICE — TISSUE ADHESIVE, EXOFIN, 1ML

## (undated) DEVICE — SUTURE, MONOCRYLIC, 4-0, P3, MONO 18

## (undated) DEVICE — DRESSING, ABDOMINAL, WET PRUF, TENDERSORB, 5 X 9 IN, STERILE

## (undated) DEVICE — BANDAGE, ELASTIC, COMPRESSION, W/REMOVABLE CLIP, 3 IN X 4.5 YD

## (undated) DEVICE — CUFF, TOURNIQUET, 18 X 3, DUAL PORT/SNGL BLADDER, DISP, LF

## (undated) DEVICE — APPLICATOR, CHLORAPREP, W/ORANGE TINT, 26ML

## (undated) DEVICE — GLOVE, SURGICAL, PROTEXIS PI , 7.5, PF, LF

## (undated) DEVICE — DRESSING, GAUZE, SPONGE, 12 PLY, 4 X 4 IN, PLASTIC POUCH, STRL 10PK

## (undated) DEVICE — GLOVE, SURGICAL, PROTEXIS PI BLUE W/NEUTHERA, 8.0, PF, LF

## (undated) DEVICE — SYRINGE, 20 CC, LUER LOCK